# Patient Record
Sex: FEMALE | Race: BLACK OR AFRICAN AMERICAN | NOT HISPANIC OR LATINO | Employment: UNEMPLOYED | URBAN - METROPOLITAN AREA
[De-identification: names, ages, dates, MRNs, and addresses within clinical notes are randomized per-mention and may not be internally consistent; named-entity substitution may affect disease eponyms.]

---

## 2023-04-18 LAB
CHLAMYDIA TRACH., AMPLIFIED: NEGATIVE
CLUE CELLS: NORMAL
N. GONORRHEA, AMPLIFIED: NEGATIVE
NUGENT SCORE: 0
TRICHOMONAS VAGINALIS: NEGATIVE
YEAST: NORMAL

## 2023-05-01 LAB
ALANINE AMINOTRANSFERASE (SGPT) (U/L) IN SER/PLAS: 13 U/L (ref 7–45)
ALBUMIN (G/DL) IN SER/PLAS: 4.6 G/DL (ref 3.4–5)
ALKALINE PHOSPHATASE (U/L) IN SER/PLAS: 31 U/L (ref 33–110)
ANION GAP IN SER/PLAS: 13 MMOL/L (ref 10–20)
ASPARTATE AMINOTRANSFERASE (SGOT) (U/L) IN SER/PLAS: 18 U/L (ref 9–39)
BASOPHILS (10*3/UL) IN BLOOD BY AUTOMATED COUNT: 0.03 X10E9/L (ref 0–0.1)
BASOPHILS/100 LEUKOCYTES IN BLOOD BY AUTOMATED COUNT: 0.6 % (ref 0–2)
BILIRUBIN TOTAL (MG/DL) IN SER/PLAS: 0.4 MG/DL (ref 0–1.2)
CALCIDIOL (25 OH VITAMIN D3) (NG/ML) IN SER/PLAS: 13 NG/ML
CALCIUM (MG/DL) IN SER/PLAS: 9.6 MG/DL (ref 8.6–10.6)
CARBON DIOXIDE, TOTAL (MMOL/L) IN SER/PLAS: 24 MMOL/L (ref 21–32)
CHLORIDE (MMOL/L) IN SER/PLAS: 105 MMOL/L (ref 98–107)
CHOLESTEROL (MG/DL) IN SER/PLAS: 169 MG/DL (ref 0–199)
CHOLESTEROL IN HDL (MG/DL) IN SER/PLAS: 72 MG/DL
CHOLESTEROL/HDL RATIO: 2.3
CREATININE (MG/DL) IN SER/PLAS: 0.62 MG/DL (ref 0.5–1.05)
EOSINOPHILS (10*3/UL) IN BLOOD BY AUTOMATED COUNT: 0.1 X10E9/L (ref 0–0.7)
EOSINOPHILS/100 LEUKOCYTES IN BLOOD BY AUTOMATED COUNT: 2.1 % (ref 0–6)
ERYTHROCYTE DISTRIBUTION WIDTH (RATIO) BY AUTOMATED COUNT: 13.1 % (ref 11.5–14.5)
ERYTHROCYTE MEAN CORPUSCULAR HEMOGLOBIN CONCENTRATION (G/DL) BY AUTOMATED: 31.5 G/DL (ref 32–36)
ERYTHROCYTE MEAN CORPUSCULAR VOLUME (FL) BY AUTOMATED COUNT: 84 FL (ref 80–100)
ERYTHROCYTES (10*6/UL) IN BLOOD BY AUTOMATED COUNT: 4.63 X10E12/L (ref 4–5.2)
ESTIMATED AVERAGE GLUCOSE FOR HBA1C: 94 MG/DL
GFR FEMALE: >90 ML/MIN/1.73M2
GLUCOSE (MG/DL) IN SER/PLAS: 85 MG/DL (ref 74–99)
HEMATOCRIT (%) IN BLOOD BY AUTOMATED COUNT: 39.1 % (ref 36–46)
HEMOGLOBIN (G/DL) IN BLOOD: 12.3 G/DL (ref 12–16)
HEMOGLOBIN A1C/HEMOGLOBIN TOTAL IN BLOOD: 4.9 %
IMMATURE GRANULOCYTES/100 LEUKOCYTES IN BLOOD BY AUTOMATED COUNT: 0.4 % (ref 0–0.9)
LDL: 85 MG/DL (ref 0–99)
LEUKOCYTES (10*3/UL) IN BLOOD BY AUTOMATED COUNT: 4.7 X10E9/L (ref 4.4–11.3)
LYMPHOCYTES (10*3/UL) IN BLOOD BY AUTOMATED COUNT: 2.3 X10E9/L (ref 1.2–4.8)
LYMPHOCYTES/100 LEUKOCYTES IN BLOOD BY AUTOMATED COUNT: 48.9 % (ref 13–44)
MONOCYTES (10*3/UL) IN BLOOD BY AUTOMATED COUNT: 0.26 X10E9/L (ref 0.1–1)
MONOCYTES/100 LEUKOCYTES IN BLOOD BY AUTOMATED COUNT: 5.5 % (ref 2–10)
NEUTROPHILS (10*3/UL) IN BLOOD BY AUTOMATED COUNT: 1.99 X10E9/L (ref 1.2–7.7)
NEUTROPHILS/100 LEUKOCYTES IN BLOOD BY AUTOMATED COUNT: 42.5 % (ref 40–80)
NRBC (PER 100 WBCS) BY AUTOMATED COUNT: 0 /100 WBC (ref 0–0)
PLATELETS (10*3/UL) IN BLOOD AUTOMATED COUNT: 280 X10E9/L (ref 150–450)
POTASSIUM (MMOL/L) IN SER/PLAS: 4.2 MMOL/L (ref 3.5–5.3)
PROTEIN TOTAL: 7.5 G/DL (ref 6.4–8.2)
SODIUM (MMOL/L) IN SER/PLAS: 138 MMOL/L (ref 136–145)
THYROTROPIN (MIU/L) IN SER/PLAS BY DETECTION LIMIT <= 0.05 MIU/L: 0.68 MIU/L (ref 0.44–3.98)
TRIGLYCERIDE (MG/DL) IN SER/PLAS: 61 MG/DL (ref 0–149)
UREA NITROGEN (MG/DL) IN SER/PLAS: 8 MG/DL (ref 6–23)
VLDL: 12 MG/DL (ref 0–40)

## 2023-09-20 LAB
HIV 1/ 2 AG/AB SCREEN: NONREACTIVE
SYPHILIS TOTAL AB: NONREACTIVE

## 2023-09-21 LAB
CHLAMYDIA TRACH., AMPLIFIED: NEGATIVE
N. GONORRHEA, AMPLIFIED: NEGATIVE

## 2023-11-15 PROBLEM — F32.A DEPRESSION: Status: ACTIVE | Noted: 2023-11-15

## 2023-11-15 PROBLEM — H52.03 HYPEROPIA OF BOTH EYES WITH ASTIGMATISM: Status: ACTIVE | Noted: 2023-11-15

## 2023-11-15 PROBLEM — R31.9 HEMATURIA: Status: ACTIVE | Noted: 2023-11-15

## 2023-11-15 PROBLEM — H52.00 HYPEROPIA: Status: ACTIVE | Noted: 2023-11-15

## 2023-11-15 PROBLEM — R07.89 ATYPICAL CHEST PAIN: Status: ACTIVE | Noted: 2023-11-15

## 2023-11-15 PROBLEM — N76.0 ACUTE VAGINITIS: Status: ACTIVE | Noted: 2023-11-15

## 2023-11-15 PROBLEM — A59.9 TRICHOMONIASIS: Status: ACTIVE | Noted: 2023-11-15

## 2023-11-15 PROBLEM — M25.519 SHOULDER PAIN: Status: ACTIVE | Noted: 2023-11-15

## 2023-11-15 PROBLEM — N89.8 VAGINAL DISCHARGE: Status: ACTIVE | Noted: 2023-11-15

## 2023-11-15 PROBLEM — J06.9 VIRAL URI: Status: ACTIVE | Noted: 2023-11-15

## 2023-11-15 PROBLEM — F48.9 MENTAL HEALTH PROBLEM: Status: ACTIVE | Noted: 2023-11-15

## 2023-11-15 PROBLEM — L02.93 CARBUNCLE: Status: ACTIVE | Noted: 2023-11-15

## 2023-11-15 PROBLEM — F41.9 ANXIETY: Status: ACTIVE | Noted: 2023-11-15

## 2023-11-15 PROBLEM — T83.32XA MALPOSITIONED INTRAUTERINE DEVICE: Status: ACTIVE | Noted: 2023-11-15

## 2023-11-15 PROBLEM — J20.9 ACUTE BRONCHITIS: Status: ACTIVE | Noted: 2023-11-15

## 2023-11-15 PROBLEM — L73.1 INGROWING HAIR: Status: ACTIVE | Noted: 2023-11-15

## 2023-11-15 PROBLEM — J30.2 SEASONAL ALLERGIES: Status: ACTIVE | Noted: 2023-11-15

## 2023-11-15 PROBLEM — L73.2 VULVAL HIDRADENITIS SUPPURATIVA: Status: ACTIVE | Noted: 2023-11-15

## 2023-11-15 PROBLEM — F12.90 MARIJUANA USE: Status: ACTIVE | Noted: 2023-11-15

## 2023-11-15 PROBLEM — N92.6 ABNORMAL MENSES: Status: ACTIVE | Noted: 2023-11-15

## 2023-11-15 PROBLEM — H52.539 ACCOMMODATION SPASM: Status: ACTIVE | Noted: 2023-11-15

## 2023-11-15 PROBLEM — M25.50 ARTHRALGIA: Status: ACTIVE | Noted: 2023-11-15

## 2023-11-15 PROBLEM — M77.8 TENDINITIS OF LEFT SHOULDER: Status: ACTIVE | Noted: 2023-11-15

## 2023-11-15 PROBLEM — R42 FEELING LIGHT HEADED: Status: ACTIVE | Noted: 2023-11-15

## 2023-11-15 PROBLEM — B34.9 VIRAL ILLNESS: Status: ACTIVE | Noted: 2023-11-15

## 2023-11-15 PROBLEM — M25.561 ACUTE PAIN OF RIGHT KNEE: Status: ACTIVE | Noted: 2023-11-15

## 2023-11-15 PROBLEM — G47.00 INSOMNIA: Status: ACTIVE | Noted: 2023-11-15

## 2023-11-15 PROBLEM — M94.0 COSTOCHONDRITIS: Status: ACTIVE | Noted: 2023-11-15

## 2023-11-15 PROBLEM — M54.2 NECK PAIN: Status: ACTIVE | Noted: 2023-11-15

## 2023-11-15 PROBLEM — H52.203 HYPEROPIA OF BOTH EYES WITH ASTIGMATISM: Status: ACTIVE | Noted: 2023-11-15

## 2023-11-15 PROBLEM — M75.52 BURSITIS OF LEFT SHOULDER: Status: ACTIVE | Noted: 2023-11-15

## 2023-11-15 PROBLEM — F17.200 NICOTINE DEPENDENCE: Status: ACTIVE | Noted: 2023-11-15

## 2023-11-15 PROBLEM — B96.89 BACTERIAL VAGINOSIS: Status: ACTIVE | Noted: 2023-11-15

## 2023-11-15 PROBLEM — N76.4 VULVAR ABSCESS: Status: ACTIVE | Noted: 2023-11-15

## 2023-11-15 PROBLEM — Z97.3 WEARS EYEGLASSES: Status: ACTIVE | Noted: 2023-11-15

## 2023-11-15 PROBLEM — N76.0 BACTERIAL VAGINOSIS: Status: ACTIVE | Noted: 2023-11-15

## 2023-11-15 RX ORDER — SERTRALINE HYDROCHLORIDE 50 MG/1
1 TABLET, FILM COATED ORAL DAILY
COMMUNITY
Start: 2022-08-17 | End: 2023-11-16 | Stop reason: WASHOUT

## 2023-11-15 RX ORDER — HYDROXYZINE HYDROCHLORIDE 25 MG/1
1 TABLET, FILM COATED ORAL 3 TIMES DAILY PRN
COMMUNITY
Start: 2022-08-17 | End: 2023-11-16 | Stop reason: WASHOUT

## 2023-11-15 RX ORDER — DESOGESTREL AND ETHINYL ESTRADIOL 0.15-0.03
1 KIT ORAL DAILY
COMMUNITY
End: 2023-11-16 | Stop reason: WASHOUT

## 2023-11-15 RX ORDER — ALBUTEROL SULFATE 90 UG/1
2 AEROSOL, METERED RESPIRATORY (INHALATION) EVERY 4 HOURS PRN
COMMUNITY
Start: 2020-12-14

## 2023-11-15 RX ORDER — FLUOXETINE 10 MG/1
1 CAPSULE ORAL DAILY
COMMUNITY
End: 2023-11-16 | Stop reason: WASHOUT

## 2023-11-15 RX ORDER — ACETAMINOPHEN 500 MG
1 TABLET ORAL NIGHTLY
COMMUNITY
Start: 2022-10-27 | End: 2024-04-09 | Stop reason: WASHOUT

## 2023-11-16 ENCOUNTER — OFFICE VISIT (OUTPATIENT)
Dept: PRIMARY CARE | Facility: HOSPITAL | Age: 33
End: 2023-11-16
Payer: COMMERCIAL

## 2023-11-16 VITALS
HEART RATE: 66 BPM | TEMPERATURE: 97.5 F | BODY MASS INDEX: 28.89 KG/M2 | HEIGHT: 61 IN | SYSTOLIC BLOOD PRESSURE: 122 MMHG | WEIGHT: 153 LBS | DIASTOLIC BLOOD PRESSURE: 73 MMHG | OXYGEN SATURATION: 97 %

## 2023-11-16 DIAGNOSIS — H53.9 VISION CHANGES: ICD-10-CM

## 2023-11-16 DIAGNOSIS — F41.9 ANXIETY: Primary | ICD-10-CM

## 2023-11-16 DIAGNOSIS — F33.1 MODERATE EPISODE OF RECURRENT MAJOR DEPRESSIVE DISORDER (MULTI): ICD-10-CM

## 2023-11-16 PROBLEM — F43.10 PTSD (POST-TRAUMATIC STRESS DISORDER): Status: ACTIVE | Noted: 2023-04-20

## 2023-11-16 PROBLEM — M75.50 BURSITIS OF SHOULDER: Status: ACTIVE | Noted: 2023-11-16

## 2023-11-16 PROBLEM — F39 UNSPECIFIED MOOD (AFFECTIVE) DISORDER (CMS-HCC): Chronic | Status: ACTIVE | Noted: 2023-04-20

## 2023-11-16 PROBLEM — F17.200 NICOTINE USE DISORDER: Status: ACTIVE | Noted: 2019-09-12

## 2023-11-16 PROBLEM — F12.20 SEVERE CANNABIS USE DISORDER (MULTI): Chronic | Status: ACTIVE | Noted: 2023-04-20

## 2023-11-16 PROBLEM — F33.2 MDD (MAJOR DEPRESSIVE DISORDER), RECURRENT EPISODE, SEVERE (MULTI): Status: ACTIVE | Noted: 2019-09-11

## 2023-11-16 PROBLEM — F32.2 CURRENT SEVERE EPISODE OF MAJOR DEPRESSIVE DISORDER WITHOUT PSYCHOTIC FEATURES (MULTI): Chronic | Status: ACTIVE | Noted: 2023-03-10

## 2023-11-16 PROCEDURE — 1036F TOBACCO NON-USER: CPT

## 2023-11-16 PROCEDURE — 99213 OFFICE O/P EST LOW 20 MIN: CPT | Mod: GC

## 2023-11-16 PROCEDURE — 99213 OFFICE O/P EST LOW 20 MIN: CPT

## 2023-11-16 RX ORDER — HYDROXYZINE HYDROCHLORIDE 25 MG/1
25 TABLET, FILM COATED ORAL NIGHTLY
Qty: 30 TABLET | Refills: 2 | Status: SHIPPED | OUTPATIENT
Start: 2023-11-16 | End: 2024-02-14

## 2023-11-16 RX ORDER — CLINDAMYCIN PHOSPHATE 10 MG/G
1 GEL TOPICAL 2 TIMES DAILY
COMMUNITY
Start: 2023-09-20

## 2023-11-16 RX ORDER — NICOTINE 11MG/24HR
2000 PATCH, TRANSDERMAL 24 HOURS TRANSDERMAL DAILY
COMMUNITY
Start: 2023-09-15 | End: 2024-03-29 | Stop reason: SDUPTHER

## 2023-11-16 RX ORDER — RISPERIDONE 0.5 MG/1
0.5 TABLET ORAL NIGHTLY
COMMUNITY
Start: 2023-04-05 | End: 2023-11-16 | Stop reason: WASHOUT

## 2023-11-16 RX ORDER — FLUOXETINE HYDROCHLORIDE 20 MG/1
20 CAPSULE ORAL DAILY
Qty: 7 CAPSULE | Refills: 0 | Status: SHIPPED | OUTPATIENT
Start: 2023-11-16 | End: 2024-03-29 | Stop reason: WASHOUT

## 2023-11-16 RX ORDER — IBUPROFEN 600 MG/1
600 TABLET ORAL EVERY 6 HOURS PRN
COMMUNITY
Start: 2023-02-23 | End: 2024-04-09 | Stop reason: WASHOUT

## 2023-11-16 RX ORDER — PROPRANOLOL HYDROCHLORIDE 10 MG/1
10 TABLET ORAL 3 TIMES DAILY PRN
COMMUNITY
Start: 2023-09-20

## 2023-11-16 RX ORDER — LURASIDONE HYDROCHLORIDE 20 MG/1
20 TABLET, FILM COATED ORAL
COMMUNITY
Start: 2023-04-20 | End: 2023-11-16 | Stop reason: WASHOUT

## 2023-11-16 RX ORDER — NORETHINDRONE ACETATE/ETHINYL ESTRADIOL AND FERROUS FUMARATE 1MG-20(24)
1 KIT ORAL DAILY
COMMUNITY
Start: 2023-09-15 | End: 2023-12-22

## 2023-11-16 RX ORDER — ARIPIPRAZOLE 5 MG/1
5 TABLET ORAL DAILY
COMMUNITY
Start: 2023-03-13 | End: 2023-11-16 | Stop reason: WASHOUT

## 2023-11-16 RX ORDER — FLUOXETINE HYDROCHLORIDE 40 MG/1
40 CAPSULE ORAL DAILY
Qty: 30 CAPSULE | Refills: 2 | Status: SHIPPED | OUTPATIENT
Start: 2023-11-16 | End: 2024-03-29 | Stop reason: WASHOUT

## 2023-11-16 ASSESSMENT — PATIENT HEALTH QUESTIONNAIRE - PHQ9
SUM OF ALL RESPONSES TO PHQ9 QUESTIONS 1 AND 2: 2
2. FEELING DOWN, DEPRESSED OR HOPELESS: SEVERAL DAYS
10. IF YOU CHECKED OFF ANY PROBLEMS, HOW DIFFICULT HAVE THESE PROBLEMS MADE IT FOR YOU TO DO YOUR WORK, TAKE CARE OF THINGS AT HOME, OR GET ALONG WITH OTHER PEOPLE: SOMEWHAT DIFFICULT
1. LITTLE INTEREST OR PLEASURE IN DOING THINGS: SEVERAL DAYS

## 2023-11-16 ASSESSMENT — PAIN SCALES - GENERAL: PAINLEVEL: 0-NO PAIN

## 2023-11-16 ASSESSMENT — COLUMBIA-SUICIDE SEVERITY RATING SCALE - C-SSRS
1. IN THE PAST MONTH, HAVE YOU WISHED YOU WERE DEAD OR WISHED YOU COULD GO TO SLEEP AND NOT WAKE UP?: NO
6. HAVE YOU EVER DONE ANYTHING, STARTED TO DO ANYTHING, OR PREPARED TO DO ANYTHING TO END YOUR LIFE?: NO
2. HAVE YOU ACTUALLY HAD ANY THOUGHTS OF KILLING YOURSELF?: NO

## 2023-11-16 ASSESSMENT — ENCOUNTER SYMPTOMS
DEPRESSION: 0
OCCASIONAL FEELINGS OF UNSTEADINESS: 0
LOSS OF SENSATION IN FEET: 0

## 2023-11-16 NOTE — PROGRESS NOTES
"Chief complaint:  Anxiety and depression f/up    HPI:  Brittany Chavez is a 33 y.o. female with history of anxiety, depression, hidradenitis suppurativa, who presents for follow up. She was last seen by me on 9/20/2023.    Regarding her anxiety/depression, she feels she's gotten some benefit from fluoxetine but still has days with low mood and feeling like she doesn't want to be around people. She tolerated fluoxetine well aside from some dry mouth in the mornings, no other side effects. She does feel anhedonia has improved. She reports her depression and anxiety is \"about 19% better.\" Still having difficulty with sleep, saying it's hard to calm her mind down at night. Hasn't taken propranolol though has had it in her hand during a panic attack a few times and thought about it. Denies SI/HI. PHQ-9 today is 14, JOSEFA-7 is 13, both improved from prior.    Ran out of fluoxetine so hasn't taken it for a week and a half, but hasn't had any withdrawal symptoms.    Discussed migraine with aura at last visit. She has not had any further severe headache since then. Does report some intermittent vision blurriness and wants to get new glasses.    Medications:    Current Outpatient Medications:     clindamycin (Cleocin T) 1 % gel, Apply 1 Application topically 2 times a day., Disp: , Rfl:     ibuprofen 600 mg tablet, Take 1 tablet (600 mg) by mouth every 6 hours if needed for mild pain (1 - 3)., Disp: , Rfl:     Eden 24 Fe 1 mg-20 mcg (24)/75 mg (4) tablet, Take 1 tablet by mouth once daily., Disp: , Rfl:     propranolol (Inderal) 10 mg tablet, Take 1 tablet (10 mg) by mouth 3 times a day as needed., Disp: , Rfl:     Vitamin D3 50 mcg (2,000 unit) capsule, Take 1 capsule (50 mcg) by mouth early in the morning.., Disp: , Rfl:     albuterol (Ventolin HFA) 90 mcg/actuation inhaler, Inhale 2 puffs every 4 hours if needed for shortness of breath or wheezing., Disp: , Rfl:     FLUoxetine (PROzac) 20 mg capsule, Take 1 capsule (20 mg) " by mouth once daily for 7 days., Disp: 7 capsule, Rfl: 0    FLUoxetine (PROzac) 40 mg capsule, Take 1 capsule (40 mg) by mouth once daily., Disp: 30 capsule, Rfl: 2    hydrOXYzine HCL (Atarax) 25 mg tablet, Take 1 tablet (25 mg) by mouth once daily at bedtime., Disp: 30 tablet, Rfl: 2    melatonin 5 mg tablet, Take 1 tablet (5 mg) by mouth once daily at bedtime., Disp: , Rfl:     Allergies:  Allergies   Allergen Reactions    Naproxen Other, Rash and Swelling     peeling of skin       Review of systems:  Constitutional: positive for increased appetite  HEENT: negative for headache, positive for blurry vision, xerostomia  Respiratory: negative for SOB, cough  Cardiovascular: negative for chest pain, palpitations  GI: negative abdominal pain, nausea, vomiting, diarrhea, constipation  Psych: positive for anxiety, depression    Vitals:  Vitals:    11/16/23 1312   BP: 122/73   Pulse: 66   Temp: 36.4 °C (97.5 °F)   SpO2: 97%       Physical exam:  Constitutional: Well-developed female in no acute distress.  HEENT: Normocephalic, atraumatic  Respiratory: CTA bilaterally. No wheezes, rales, or rhonchi. Normal respiratory effort.  Cardiovascular: RRR. No murmurs, gallops, or rubs.   Psych: Appropriate mood. Slightly blunted affect.    Labs:  No results found for this or any previous visit (from the past 24 hour(s)).    Imaging:  No results found.    Assessment and plan:  Brittany Chavez is a 33 year old female with PMHx anxiety, depression, and hidradenitis suppurativa that presents for mental health follow up.     #Depression  #Anxiety  #Panic disorder  #Insomnia  Had good response to fluoxetine 20 mg with minimal side effects  Has not taken propranolol though has them at home, may take them in past  Plan:  Increase fluoxetine to 40 mg daily (will have her do one week of 20 mg then increase because she did not take it for the past week). She will let me know via portal if she does not tolerate 40 mg and we can decrease to 20 mg  daily  Start hydroxyzine 25 mg nightly for anxiety/insomnia  Recommended she try propranolol 10 mg TID PRN     #vision change  Referred pt to optometry for vision eval     Not addressed at this visit:     #health maintenance   lipid panel wnl, HbA1c 4.9% 5/2023  HIV, Hep B and C negative in 2020  pap smear 4/2023 normal    Follow-up in 8 weeks with me via video visit.    Patient and plan discussed with attending physician, Dr. Garcia.    Brenda Garcia MD  PGY-1 Internal Medicine  [facility]

## 2023-11-16 NOTE — PATIENT INSTRUCTIONS
It was a pleasure to see you in clinic today! We discussed the following topics:  Anxiety and depression: I'm glad you have been benefitting from fluoxetine! Let's increase your dose to 40 mg daily (after a week of 20 daily to get it back in your system). You'll also take hydroxyzine (atarax) one tablet nightly to help with sleep. Give the propranolol a try when you're anxious too--it might help calm you down.    Please follow-up with an video visit with us in 6-8 weeks.     is now using [employer], an online health record portal with your visit notes and results and the ability to portal message your physicians. Ask about getting access at the check-out desk!    To schedule a specialty appointment or test, please call [phone].    If you have any questions or concerns, please contact the [facility] at [phone].    Sincerely,    Brenda Garcia MD  PGY-1 Internal Medicine  [facility]  [facility]  Phone: [phone]  Fax: [phone]

## 2023-11-21 NOTE — PROGRESS NOTES
I saw and evaluated the patient. I personally obtained the key and critical portions of the history and physical exam or was physically present for key and critical portions performed by the resident/fellow. I reviewed the resident/fellow's documentation and discussed the patient with the resident/fellow. I agree with the resident/fellow's medical decision making as documented in the note.    Alisha Garcia MD MPH

## 2023-12-28 ENCOUNTER — TELEPHONE (OUTPATIENT)
Dept: GERIATRIC MEDICINE | Facility: HOSPITAL | Age: 33
End: 2023-12-28
Payer: COMMERCIAL

## 2024-01-25 ENCOUNTER — OFFICE VISIT (OUTPATIENT)
Dept: URGENT CARE | Facility: CLINIC | Age: 34
End: 2024-01-25
Payer: COMMERCIAL

## 2024-01-25 VITALS
WEIGHT: 155.2 LBS | BODY MASS INDEX: 29.32 KG/M2 | TEMPERATURE: 98 F | OXYGEN SATURATION: 98 % | RESPIRATION RATE: 16 BRPM | SYSTOLIC BLOOD PRESSURE: 108 MMHG | HEART RATE: 70 BPM | DIASTOLIC BLOOD PRESSURE: 71 MMHG

## 2024-01-25 DIAGNOSIS — J01.10 ACUTE NON-RECURRENT FRONTAL SINUSITIS: ICD-10-CM

## 2024-01-25 DIAGNOSIS — N76.0 ACUTE VAGINITIS: Primary | ICD-10-CM

## 2024-01-25 PROCEDURE — 87205 SMEAR GRAM STAIN: CPT

## 2024-01-25 PROCEDURE — 87800 DETECT AGNT MULT DNA DIREC: CPT

## 2024-01-25 PROCEDURE — 87661 TRICHOMONAS VAGINALIS AMPLIF: CPT

## 2024-01-25 PROCEDURE — 1036F TOBACCO NON-USER: CPT | Performed by: PHYSICIAN ASSISTANT

## 2024-01-25 PROCEDURE — 99214 OFFICE O/P EST MOD 30 MIN: CPT | Performed by: PHYSICIAN ASSISTANT

## 2024-01-25 RX ORDER — FLUCONAZOLE 150 MG/1
TABLET ORAL
Qty: 2 TABLET | Refills: 0 | Status: SHIPPED | OUTPATIENT
Start: 2024-01-25

## 2024-01-25 RX ORDER — AMOXICILLIN AND CLAVULANATE POTASSIUM 875; 125 MG/1; MG/1
1 TABLET, FILM COATED ORAL 2 TIMES DAILY
Qty: 14 TABLET | Refills: 0 | Status: SHIPPED | OUTPATIENT
Start: 2024-01-25 | End: 2024-02-01

## 2024-01-25 ASSESSMENT — ENCOUNTER SYMPTOMS
DYSURIA: 0
EYE DISCHARGE: 0
ACTIVITY CHANGE: 0
COUGH: 1
EYE REDNESS: 0
WOUND: 0
SINUS PAIN: 1
ENDOCRINE NEGATIVE: 1
BLOOD IN STOOL: 0
PSYCHIATRIC NEGATIVE: 1
SINUS PRESSURE: 1
BACK PAIN: 0
APPETITE CHANGE: 0
VOMITING: 0
RHINORRHEA: 1
ARTHRALGIAS: 0
NEUROLOGICAL NEGATIVE: 1
SORE THROAT: 1
SHORTNESS OF BREATH: 0
ABDOMINAL PAIN: 0
FLANK PAIN: 0
FATIGUE: 0
FEVER: 0
WHEEZING: 0
ALLERGIC/IMMUNOLOGIC NEGATIVE: 1
DIARRHEA: 0
HEMATOLOGIC/LYMPHATIC NEGATIVE: 1
COLOR CHANGE: 0
EYE PAIN: 0
NAUSEA: 0

## 2024-01-25 ASSESSMENT — PAIN SCALES - GENERAL: PAINLEVEL: 7

## 2024-01-25 NOTE — PATIENT INSTRUCTIONS
Assessment/Plan   Problem List Items Addressed This Visit       Acute vaginitis - Primary    Relevant Medications    fluconazole (Diflucan) 150 mg tablet     Other Visit Diagnoses       Acute non-recurrent frontal sinusitis        Relevant Medications    amoxicillin-pot clavulanate (Augmentin) 875-125 mg tablet        -Patient with sinusitis sxs for 7 days, worsening. Treating with augmentin  - physical exam raises suspicion for yeast vaginitis, treating with diflucan  -Sending testing for gonorrhea, chlamydia, trichomonas, and yeast vaginitis and bacterial vaginosis

## 2024-01-25 NOTE — PROGRESS NOTES
Subjective   Patient ID: Brittany Chavez is a 34 y.o. female who presents for Cough (Vag irritation).  Patient notes frontal headache nasal congestion and cough over the course the last week.  She denies any fevers or chills denies any shortness of breath or chest pain.  She also has a secondary complaint of vaginal irritation and milky white discharge.  She is concerned about the possibility of STIs versus bacterial vaginosis versus yeast.  She denies any pain but does endorse external irritation    Past Medical History:   Diagnosis Date    Chlamydial infection, unspecified     Chlamydia    Dysuria 10/27/2014    Dysuria    Encounter for fitting and adjustment of spectacles and contact lenses     Contact lens/glasses fitting    Encounter for gynecological examination (general) (routine) without abnormal findings 05/15/2015    Well woman exam    Encounter for insertion of intrauterine contraceptive device     Encounter for insertion of mirena IUD    Encounter for insertion of intrauterine contraceptive device     Encounter for insertion of mirena IUD    Encounter for supervision of normal first pregnancy, unspecified trimester     Primigravida    Other conditions influencing health status     History of pregnancy    Other problems related to lifestyle 07/12/2016    Other problems related to lifestyle    Other specified health status     Last menstrual period (LMP) > 10 days ago    Other specified postprocedural states     Required emergent intubation    Personal history of other diseases of the nervous system and sense organs     History of sleep disturbance    Personal history of other diseases of the respiratory system     Personal history of asthma    Personal history of other infectious and parasitic diseases     History of sexually transmitted disease    Personal history of other mental and behavioral disorders     History of depression    Type A blood, Rh negative     Blood type A-    Urogenital trichomoniasis,  unspecified     Trichs - trichomonas vaginalis infection       Review of Systems   Constitutional:  Negative for activity change, appetite change, fatigue and fever.   HENT:  Positive for congestion, rhinorrhea, sinus pressure, sinus pain and sore throat.    Eyes:  Negative for pain, discharge and redness.   Respiratory:  Positive for cough. Negative for shortness of breath and wheezing.    Cardiovascular:  Negative for chest pain and leg swelling.   Gastrointestinal:  Negative for abdominal pain, blood in stool, diarrhea, nausea and vomiting.   Endocrine: Negative.    Genitourinary:  Positive for vaginal discharge. Negative for dysuria, flank pain and genital sores.   Musculoskeletal:  Negative for arthralgias, back pain and gait problem.   Skin:  Negative for color change, rash and wound.   Allergic/Immunologic: Negative.    Neurological: Negative.    Hematological: Negative.    Psychiatric/Behavioral: Negative.         Objective   /71   Pulse 70   Temp 36.7 °C (98 °F)   Resp 16   Wt 70.4 kg (155 lb 3.2 oz)   SpO2 98%   BMI 29.32 kg/m²   Physical Exam  Vitals reviewed. Exam conducted with a chaperone present (Taylor CHAVEZ).   Constitutional:       General: She is not in acute distress.     Appearance: Normal appearance. She is not toxic-appearing.   HENT:      Head: Normocephalic.      Right Ear: Tympanic membrane and ear canal normal. No tenderness. No mastoid tenderness.      Left Ear: Tympanic membrane and ear canal normal. No tenderness. No mastoid tenderness.      Nose: Congestion and rhinorrhea present.      Mouth/Throat:      Mouth: Mucous membranes are moist.      Pharynx: Oropharynx is clear. Posterior oropharyngeal erythema present.   Eyes:      Conjunctiva/sclera: Conjunctivae normal.      Pupils: Pupils are equal, round, and reactive to light.   Cardiovascular:      Rate and Rhythm: Normal rate and regular rhythm.      Heart sounds: No murmur heard.  Pulmonary:      Effort: No respiratory  distress.      Breath sounds: No stridor. No wheezing, rhonchi or rales.   Chest:      Chest wall: No tenderness.   Abdominal:      Tenderness: There is no abdominal tenderness. There is no guarding.   Genitourinary:     Pubic Area: No rash.       Labia:         Right: No rash, tenderness or lesion.         Left: No rash, tenderness or lesion.       Vagina: No foreign body. No erythema, tenderness or lesions.      Cervix: Discharge present. No cervical motion tenderness, friability, erythema or cervical bleeding.      Comments: Clumping white vaginal discharge present  Musculoskeletal:         General: Normal range of motion.   Skin:     General: Skin is warm and dry.      Capillary Refill: Capillary refill takes less than 2 seconds.      Findings: No erythema.   Neurological:      General: No focal deficit present.      Mental Status: She is alert.         Assessment/Plan   Problem List Items Addressed This Visit       Acute vaginitis - Primary    Relevant Medications    fluconazole (Diflucan) 150 mg tablet     Other Visit Diagnoses       Acute non-recurrent frontal sinusitis        Relevant Medications    amoxicillin-pot clavulanate (Augmentin) 875-125 mg tablet        -Patient with sinusitis sxs for 7 days, worsening. Treating with augmentin  - physical exam raises suspicion for yeast vaginitis, treating with diflucan  -Sending testing for gonorrhea, chlamydia, trichomonas, and yeast vaginitis and bacterial vaginosis

## 2024-01-26 LAB
C TRACH RRNA SPEC QL NAA+PROBE: NEGATIVE
N GONORRHOEA DNA SPEC QL PROBE+SIG AMP: NEGATIVE
T VAGINALIS RRNA SPEC QL NAA+PROBE: NEGATIVE

## 2024-01-27 LAB
CLUE CELLS VAG LPF-#/AREA: ABNORMAL /[LPF]
NUGENT SCORE: 2
YEAST VAG WET PREP-#/AREA: PRESENT

## 2024-03-27 ENCOUNTER — APPOINTMENT (OUTPATIENT)
Dept: PRIMARY CARE | Facility: HOSPITAL | Age: 34
End: 2024-03-27
Payer: COMMERCIAL

## 2024-03-29 ENCOUNTER — OFFICE VISIT (OUTPATIENT)
Dept: PRIMARY CARE | Facility: HOSPITAL | Age: 34
End: 2024-03-29
Payer: COMMERCIAL

## 2024-03-29 VITALS
TEMPERATURE: 97.4 F | BODY MASS INDEX: 29.27 KG/M2 | OXYGEN SATURATION: 100 % | SYSTOLIC BLOOD PRESSURE: 105 MMHG | WEIGHT: 155 LBS | HEART RATE: 61 BPM | DIASTOLIC BLOOD PRESSURE: 70 MMHG | HEIGHT: 61 IN

## 2024-03-29 DIAGNOSIS — F33.1 MODERATE EPISODE OF RECURRENT MAJOR DEPRESSIVE DISORDER (MULTI): ICD-10-CM

## 2024-03-29 DIAGNOSIS — F41.9 ANXIETY: ICD-10-CM

## 2024-03-29 DIAGNOSIS — H53.9 VISION CHANGES: ICD-10-CM

## 2024-03-29 DIAGNOSIS — E55.9 VITAMIN D DEFICIENCY: Primary | ICD-10-CM

## 2024-03-29 DIAGNOSIS — N94.6 DYSMENORRHEA: ICD-10-CM

## 2024-03-29 PROCEDURE — 1036F TOBACCO NON-USER: CPT

## 2024-03-29 PROCEDURE — 99214 OFFICE O/P EST MOD 30 MIN: CPT | Mod: GC

## 2024-03-29 PROCEDURE — 99214 OFFICE O/P EST MOD 30 MIN: CPT

## 2024-03-29 RX ORDER — FLUOXETINE HYDROCHLORIDE 20 MG/1
20 CAPSULE ORAL DAILY
Qty: 30 CAPSULE | Refills: 11 | Status: SHIPPED | OUTPATIENT
Start: 2024-03-29 | End: 2025-03-29

## 2024-03-29 RX ORDER — NICOTINE 11MG/24HR
2000 PATCH, TRANSDERMAL 24 HOURS TRANSDERMAL DAILY
Qty: 30 CAPSULE | Refills: 11 | Status: SHIPPED | OUTPATIENT
Start: 2024-03-29 | End: 2025-03-29

## 2024-03-29 ASSESSMENT — PAIN SCALES - GENERAL: PAINLEVEL: 0-NO PAIN

## 2024-03-29 ASSESSMENT — ENCOUNTER SYMPTOMS
OCCASIONAL FEELINGS OF UNSTEADINESS: 0
DEPRESSION: 0
LOSS OF SENSATION IN FEET: 0

## 2024-03-29 ASSESSMENT — LIFESTYLE VARIABLES
HOW OFTEN DO YOU HAVE SIX OR MORE DRINKS ON ONE OCCASION: NEVER
HOW MANY STANDARD DRINKS CONTAINING ALCOHOL DO YOU HAVE ON A TYPICAL DAY: PATIENT DOES NOT DRINK
AUDIT-C TOTAL SCORE: 0
SKIP TO QUESTIONS 9-10: 1
HOW OFTEN DO YOU HAVE A DRINK CONTAINING ALCOHOL: NEVER

## 2024-03-29 ASSESSMENT — PATIENT HEALTH QUESTIONNAIRE - PHQ9
1. LITTLE INTEREST OR PLEASURE IN DOING THINGS: SEVERAL DAYS
SUM OF ALL RESPONSES TO PHQ9 QUESTIONS 1 & 2: 2
2. FEELING DOWN, DEPRESSED OR HOPELESS: SEVERAL DAYS

## 2024-03-29 NOTE — PATIENT INSTRUCTIONS
Dear Ms. Scott,     It was a pleasure meeting you in clinic today. Here is what we discussed:   For your arm numbness/tingling in morning - try to sleep on your back and not on your arm/shoulder. If after doing this, things do not improve, please make sure to give us a call.   I am sorry that you are having anxiety - I have sent refills for your fluoxetine and your vitamin d pills. Please make sure to pick those up.   I have also referred you for [facility], they will help set you up with a therapist.   I have also referred you to gynecology to further discuss contraception for your heavy and frequent periods.   I have also referred you to optometry to talk about your eye spasms and blurry vision.     Please see us back in 6 weeks. We wish you the best!  Dr. Sandra Muñiz

## 2024-03-29 NOTE — PROGRESS NOTES
HISTORY OF PRESENT ILLNESS:     Brittany Chavez is a 34 y.o. female with history of anxiety, depression, hidradenitis suppurativa, dysmenorrhea who presents for follow up. She was last seen at the clinic in Nov 2023.     Today she states that her main concerns are irregular periods, morning numbness, and eye spasms.     Irregular periods: has had these before, last saw obgyn in April 2023, was on birth control but she ended up missing a few days of the medication, and then she tried to restart the medication but then felt like it threw off her body and she started having a lot of bleeding so she stopped the birth control. she is no longer on birth control. She does not think she has seen her obgyn after she has stopped the ocps. She states that she has been having periods every 2 weeks for 7 days each time - this was the reason she originally started hormonal pills last years with her obgyn. She states that she does feel like the birth control did initially help. Now she is back to having a period every 2 weeks, but is concerned even more because she feels like her periods are becoming more frequent and lasting longer. Her latest period was from 3/16-3/25. (The period before that was from 2/24-3/1). She has tried depot shots in the past (made her bleeding worse) and has also tried mirena IUD in past.     Morning numbness: when she wakes up her arm feels numb and takes a while to get it to work again. She states that she has either L or R arm numbness/tingling in the morning after she wakes up depending on which side she sleeps on. She states she tries to keep herself moving during the day and so does not really feel any numbness or tingling during the day, she does not feel it right now either.     eye spasms: has not seen an eye doctor, feels like her eyes spasms intermittently daily. No triggers. Typically both eyes, but the L eye bothers her more.       States that she has pretty bad anxiety, stopped taking  fluoxetine and hydroxyzine. She states that she has a lot going on and she also doesn't like taking medications. She would prefer a therapist (was seeing one in [place], but wants a new one). She has a lot weighing on her (family health related things happening).       ROS: 12 point ROS negative unless as per HPI     Patient Active Problem List    Diagnosis Date Noted    Bursitis of shoulder 11/16/2023    Abnormal menses 11/15/2023    Accommodation spasm 11/15/2023    Acute bronchitis 11/15/2023    Acute pain of right knee 11/15/2023    Acute vaginitis 11/15/2023    Anxiety 11/15/2023    Arthralgia 11/15/2023    Atypical chest pain 11/15/2023    Bursitis of left shoulder 11/15/2023    Carbuncle 11/15/2023    Costochondritis 11/15/2023    Depression 11/15/2023    Feeling light headed 11/15/2023    Hematuria 11/15/2023    Hyperopia 11/15/2023    Hyperopia of both eyes with astigmatism 11/15/2023    Ingrowing hair 11/15/2023    Insomnia 11/15/2023    Malpositioned intrauterine device 11/15/2023    Marijuana use 11/15/2023    Mental health problem 11/15/2023    Neck pain 11/15/2023    Nicotine dependence 11/15/2023    Seasonal allergies 11/15/2023    Shoulder pain 11/15/2023    Tendinitis of left shoulder 11/15/2023    Trichomoniasis 11/15/2023    Vaginal discharge 11/15/2023    Viral illness 11/15/2023    Viral URI 11/15/2023    Bacterial vaginosis 11/15/2023    Vulval hidradenitis 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    Vulvar abscess 11/15/2023    Wears eyeglasses 11/15/2023    PTSD (post-traumatic stress disorder) 04/20/2023    Severe cannabis use disorder (CMS/HCC) 04/20/2023    Unspecified mood (affective) disorder (CMS/HCC) 04/20/2023    Current severe episode of major depressive disorder without psychotic features (CMS/[facility]) 03/10/2023    Anxiety disorder, unspecified 03/10/2023    Nicotine use disorder 09/12/2019    MDD (major depressive disorder), recurrent episode, severe (CMS/HCC) 09/11/2019        Current Outpatient  Medications:     albuterol (Ventolin HFA) 90 mcg/actuation inhaler, Inhale 2 puffs every 4 hours if needed for shortness of breath or wheezing., Disp: , Rfl:     clindamycin (Cleocin T) 1 % gel, Apply 1 Application topically 2 times a day., Disp: , Rfl:     fluconazole (Diflucan) 150 mg tablet, Take one tablet today, if symptoms perist after 72 hours take 2nd tablet, Disp: 2 tablet, Rfl: 0    FLUoxetine (PROzac) 20 mg capsule, Take 1 capsule (20 mg) by mouth once daily for 7 days., Disp: 7 capsule, Rfl: 0    FLUoxetine (PROzac) 40 mg capsule, Take 1 capsule (40 mg) by mouth once daily., Disp: 30 capsule, Rfl: 2    hydrOXYzine HCL (Atarax) 25 mg tablet, Take 1 tablet (25 mg) by mouth once daily at bedtime., Disp: 30 tablet, Rfl: 2    ibuprofen 600 mg tablet, Take 1 tablet (600 mg) by mouth every 6 hours if needed for mild pain (1 - 3)., Disp: , Rfl:     melatonin 5 mg tablet, Take 1 tablet (5 mg) by mouth once daily at bedtime., Disp: , Rfl:     norethindrone-e.estradioL-iron (Eden 24 Fe) 1 mg-20 mcg (24)/75 mg (4) tablet, Take 1 tablet by mouth once daily, Disp: 84 tablet, Rfl: 2    propranolol (Inderal) 10 mg tablet, Take 1 tablet (10 mg) by mouth 3 times a day as needed., Disp: , Rfl:     Vitamin D3 50 mcg (2,000 unit) capsule, Take 1 capsule (50 mcg) by mouth early in the morning.., Disp: , Rfl:     No results found for this or any previous visit (from the past 96 hour(s)).       PHYSICAL EXAM:   General: pt is pleasant, cooperative, in NAD  Heart: RRR, no murmur, rub or [place]  Lungs: clear to auscultation bilaterally with good airflow throughout. No wheezes or rhonchi.  Abdomen: soft, nontender, nondistended, no apparent mass  Extremities: no edema bilaterally  Skin: no rash or lesions; warm and dry   Psychiatric: mental status and behavior appropriate for situation   Neurologic: Normal gait and stance. Normal speech character and tone. No apparent focal deficits.       Musculoskeletal: moving all extremities  appropriately.    Assessment and plan:   Brittany Chavez is a 34 year old female with PMHx anxiety, depression, and hidradenitis suppurativa, dysmenorrhea that presents for follow up.     #Depression  #Anxiety  #Panic disorder  #Insomnia  -pt has anxiety  -Was seeing a therapist at [place] and wants a new one - is trying to find a therapist right now   -Has stopped taking fluoxetine 40mg, hydroxyzine, propranolol   Plan:   -Will [facility] to [facility] as she wants to talk to someone   -will restart fluoxetine at 20mg dose (she does not really like taking medication but is willing today)  -consider uptitrating fluoxetine vs adding back hydroxyzine or propranolol at fuv if sx of anxiety persist      #vision change  #blepharospasm  -pt has intermittent vision blurriness   -has had eye spasms (bilateral, but L eye bothers more)  -will refer pt to optometry      #dysmenorrhea  #frequent periods  -pt has periods every 2 weeks, latest period lasted ~10d  -seen gyn in April 2023, was Rx'd hormonal contraception at the time but pt missed a few days of pills and then tried to restart but this led to worsening of her sx (she said it threw off her body, worsened bleeding) and so she stopped taking the pills   -no anemia in September 2023  Plan:   -will request a follow up with gynecology to discuss contraception      #shoulder/arm numbness and tingling  -occurs in AM after waking up  -states that she sleeps on her side   -Xray of L shoulder in May 2023 was normal (has had steroid injection w ortho)   Plan:   -try to change sleeping position - aim to sleep on back      #vulvular HS  -follows with gynecology     #vit D deficiency  - vitamin D 13 on 5/1  - advised pt to continue vit D supplementation      #Health Maintenance  Cancer Screening:  - PAP smear: 4/2023      Cardiovascular Screening:   - Lipid panel:     Endocrine:   - Last HbA1c: 4.9% 5/2023     Infectious disease:  - HIV: neg (2020)  - Syphilis:   - Hepatitis B: neg  (2020)  - Hepatitis C: neg (2020)  - STI screen: neg (2024) - positive for yeast in March 2024 s/p diflucan      Vaccines:   - Influenza:   - Shingles:   - Tdap:   - Pneumonia:   - COVID:      Safety:  - Housing insecurity:   - Food insecurity:   - Firearms in house:   - Seatbelt use:     RTC IN 6 weeks      Sandra Muñiz MD   IM PGY-2

## 2024-03-31 NOTE — PROGRESS NOTES
I reviewed the resident/fellow's documentation and discussed the patient with the resident/fellow. I agree with the resident/fellow's medical decision making as documented in the note.     Alisha Garcia MD MPH

## 2024-04-09 ENCOUNTER — OFFICE VISIT (OUTPATIENT)
Dept: OBSTETRICS AND GYNECOLOGY | Facility: CLINIC | Age: 34
End: 2024-04-09
Payer: COMMERCIAL

## 2024-04-09 VITALS
BODY MASS INDEX: 29.15 KG/M2 | WEIGHT: 154.4 LBS | SYSTOLIC BLOOD PRESSURE: 104 MMHG | DIASTOLIC BLOOD PRESSURE: 60 MMHG | HEIGHT: 61 IN

## 2024-04-09 DIAGNOSIS — N93.9 ABNORMAL UTERINE BLEEDING (AUB): ICD-10-CM

## 2024-04-09 PROCEDURE — 99213 OFFICE O/P EST LOW 20 MIN: CPT | Performed by: ADVANCED PRACTICE MIDWIFE

## 2024-04-09 PROCEDURE — 1036F TOBACCO NON-USER: CPT | Performed by: ADVANCED PRACTICE MIDWIFE

## 2024-04-09 RX ORDER — CLINDAMYCIN PHOSPHATE, BENZOYL PEROXIDE 25; 10 MG/G; MG/G
GEL TOPICAL
COMMUNITY
Start: 2022-12-09

## 2024-04-09 RX ORDER — DROSPIRENONE AND ETHINYL ESTRADIOL 0.03MG-3MG
1 KIT ORAL DAILY
Qty: 84 TABLET | Refills: 3 | Status: SHIPPED | OUTPATIENT
Start: 2024-04-09 | End: 2025-04-09

## 2024-04-09 NOTE — PROGRESS NOTES
"Assessment/Plan   Brittany was seen today for vaginal bleeding.  Diagnoses and all orders for this visit:  Abnormal uterine bleeding (AUB)  -     Referral to Obstetrics / Gynecology  -     drospirenone-ethinyl estradioL (Anamaria, 28,) 3-0.03 mg tablet; Take 1 tablet by mouth once daily.  Ultrasound 5/2023 reviewed  AUB may be due to inconsistent pill use as previously well controlled on regular pill use  Pill changed for HS and improved estrogen support; Screened and appropriate for [facility] use. Risks/benefits/side effects/adverse effects. Instructions of use. Warning S&S reviewed.  Follow up if continued difficulty with pill consistency or AUB persists    Annual exam 1yr    Subjective   Brittany Chavez is a 34 y.o. female who presents for Abnormal bleeding   Frequent and irregular  Onset: 01/11/2024  Home therapies: B/C pills for AUB, reports missing some pills then when started retaking her bleeding remained abnormal with spotting, she has stopped taking them at all  Partner Vasectomy  Declines STI testing  HS/Boil before periods, drains on own    ROS as in HPI    Objective   /60   Ht 1.549 m (5' 1\")   Wt 70 kg (154 lb 6.4 oz)   LMP 04/02/2024   BMI 29.17 kg/m²     Physical Exam  Constitutional:       General: She is not in acute distress.  Cardiovascular:      Rate and Rhythm: Normal rate and regular rhythm.      Heart sounds: Normal heart sounds.   Pulmonary:      Effort: Pulmonary effort is normal.      Breath sounds: Normal breath sounds.   Chest:      Chest wall: No deformity, swelling or tenderness.   Breasts:     Right: Normal.      Left: Normal.   Abdominal:      Palpations: Abdomen is soft. There is no mass.      Tenderness: There is no abdominal tenderness.   Genitourinary:     General: Normal vulva.      Vagina: No vaginal discharge, erythema, tenderness, bleeding or lesions.      Cervix: No cervical motion tenderness, discharge, friability, lesion or cervical bleeding.      Uterus: Normal. Not " enlarged, not fixed and not tender.       Adnexa: Right adnexa normal and left adnexa normal.        Right: No mass, tenderness or fullness.          Left: No mass, tenderness or fullness.        Rectum: Normal. No anal fissure or external hemorrhoid.   Lymphadenopathy:      Upper Body:      Right upper body: No supraclavicular or axillary adenopathy.      Left upper body: No supraclavicular or axillary adenopathy.   Neurological:      Mental Status: She is alert.

## 2024-06-04 ENCOUNTER — APPOINTMENT (OUTPATIENT)
Dept: PRIMARY CARE | Facility: HOSPITAL | Age: 34
End: 2024-06-04
Payer: COMMERCIAL

## 2024-06-04 NOTE — PROGRESS NOTES
Chief complaint:    HPI:  Brittany Chavez is a 34 year old female with PMHx anxiety, depression, and hidradenitis suppurativa, dysmenorrhea that presents for follow up.    She was last seen 3/29/24 at [facility] for irregular periods after stopping OCPs, numbness in her arms when she wakes up, and eye spasms. She was referred to access clinic for her anxiety and re-started on fluoxetine. She was also referred to opthalmology for blepharospasm and OBGYN for dysmenorrhea. She was encouraged to try sleeping on her back to see if this would help her arm numbness.    Since her last visit she saw her OBGYN 4/9/24 for which her AUB was thought to be due to her intermittent medication use and she was told to continue on her OCP.     Today,       Medications:    Current Outpatient Medications:     albuterol (Ventolin HFA) 90 mcg/actuation inhaler, Inhale 2 puffs every 4 hours if needed for shortness of breath or wheezing., Disp: , Rfl:     clindamycin (Cleocin T) 1 % gel, Apply 1 Application topically 2 times a day., Disp: , Rfl:     clindamycin-benzoyl peroxide gel, Clindamycin Phos-Benzoyl Perox 1.2-2.5 % External Gel APPLY A PEA-SIZED AMOUNT TO THE ENTIRE FACE ONCE DAILY AT BEDTIME. Quantity: 1 Refills: 4 Ordered: 9-Dec-2022 Jeremiah ESQUIVELCarlota Start : 9-Dec-2022 Active, Disp: , Rfl:     drospirenone-ethinyl estradioL (Anamaria, 28,) 3-0.03 mg tablet, Take 1 tablet by mouth once daily., Disp: 84 tablet, Rfl: 3    fluconazole (Diflucan) 150 mg tablet, Take one tablet today, if symptoms perist after 72 hours take 2nd tablet, Disp: 2 tablet, Rfl: 0    FLUoxetine (PROzac) 20 mg capsule, Take 1 capsule (20 mg) by mouth once daily., Disp: 30 capsule, Rfl: 11    hydrOXYzine HCL (Atarax) 25 mg tablet, Take 1 tablet (25 mg) by mouth once daily at bedtime., Disp: 30 tablet, Rfl: 2    norethindrone-e.estradioL-iron (Eden 24 Fe) 1 mg-20 mcg (24)/75 mg (4) tablet, Take 1 tablet by mouth once daily, Disp: 84 tablet, Rfl: 2    propranolol (Inderal) 10  mg tablet, Take 1 tablet (10 mg) by mouth 3 times a day as needed., Disp: , Rfl:     Vitamin D3 50 mcg (2,000 unit) capsule, Take 1 capsule (50 mcg) by mouth early in the morning.., Disp: 30 capsule, Rfl: 11    Allergies:  Allergies   Allergen Reactions    Naproxen Other, Rash and Swelling     peeling of skin       Review of systems:  Constitutional: negative for fevers, chills, weight loss, weight gain, change in appetite, fatigue, weakness.  HEENT: negative for headache, changes in vision or hearing, congestion, sore throat.  Respiratory: negative for SOB, cough, hemoptysis, wheezing  Cardiovascular: negative for chest pain, palpitations, orthopnea, PND  GI: negative for dysphagia, abdominal pain, nausea, vomiting, diarrhea, constipation, melena, hematochezia, BRBPR  : negative for frequency, urgency, dysuria, hematuria, incontinence  MSK: negative for myalgia, arthralgia, decreased joint ROM, LE swelling  Skin: negative for rash, wounds  Heme/lymph: negative for easy bruising, bleeding, epistaxis  Neuro: negative for LOC, numbness, tingling, tremor, vertigo, dizziness    Vitals:  There were no vitals filed for this visit.    Physical exam:  Constitutional: Well-developed female in no acute distress.  HEENT: Normocephalic, atraumatic. PERRL. EOMI. No cervical lymphadenopathy.  Respiratory: CTA bilaterally. No wheezes, rales, or rhonchi. Normal respiratory effort.  Cardiovascular: RRR. No murmurs, gallops, or rubs. No JVD. Radial pulses 2+.  Abdominal: Soft, nondistended, nontender to palpation. Bowel sounds present. No hepatosplenomegaly or masses. No CVA tenderness.  Neuro: CN II-XII intact. UE and LE strength 5/5 bilaterally and sensation intact. Normal FTN testing.  MSK: No LE edema bilaterally.  Skin: Warm, dry. No rashes or wounds.  Psych: Appropriate mood and affect.    Labs:  No results found for this or any previous visit (from the past 24 hour(s)).    Imaging:  No results found.    Assessment and  plan:  Brittany Chavez is a 34 year old female with PMHx anxiety, depression, and hidradenitis suppurativa, dysmenorrhea that presents for follow up.    -fluoxetine  -AUB  -overweight?  -smoker?       #Depression  #Anxiety  #Panic disorder  #Insomnia  -pt has anxiety  -Was seeing a therapist at [facility] and wants a new one - is trying to find a therapist right now, referred to access clinic on last visit  -Has stopped taking fluoxetine 40mg, hydroxyzine, propranolol   Plan:   -on fluoxetine at 20mg dose, consider up titration  -consider uptitrating fluoxetine vs adding back hydroxyzine or propranolol at fuv if sx of anxiety persist      #vision change  #blepharospasm  -pt has intermittent vision blurriness   -has had eye spasms (bilateral, but L eye bothers more)  -referred to optho last visit     #dysmenorrhea  #frequent periods  -pt has periods every 2 weeks, latest period lasted ~10d  -saw OBGYN in  who thought AUB was due to medication use and recommended continuing OCPs  -no anemia in 2023  Plan:   -continue OCPs  -fu with OBGYN every year       #shoulder/arm numbness and tingling  -occurs in AM after waking up  -states that she sleeps on her side   -Xray of L shoulder in May 2023 was normal (has had steroid injection w ortho)   Plan:   -try to change sleeping position - aim to sleep on back      #vulvular HS  -follows with gynecology      #vit D deficiency  - vitamin D 13 on   - advised pt to continue vit D supplementation        #Health Maintenance  Cancer Screening:  - PAP smear with co testin2023 negative  - Colonoscopy, mammogram not yet indicated       Cardiovascular Screening:   - Lipid panel 23: Tchol 69, HDL 72, LDL 85, trig 61     Endocrine:   - Last HbA1c: 4.9% 2023     Infectious disease:  - HIV: neg ()  - Syphilis:   - Hepatitis B: neg ()  - Hepatitis C: neg ()  - STI screen: neg () - positive for yeast in 2024 s/p diflucan    Vaccines:   - Influenza:    - Shingles: Not indicated   - Tdap:   - Pneumonia: Not indicated  - COVID:       Follow-up in ***.    Patient and plan discussed with attending physician, Dr. Hirsch.    Gibran Fuentes MD  PGY-1 Internal Medicine  [facility]

## 2024-07-02 ENCOUNTER — APPOINTMENT (OUTPATIENT)
Dept: PRIMARY CARE | Facility: HOSPITAL | Age: 34
End: 2024-07-02
Payer: COMMERCIAL

## 2024-07-12 ENCOUNTER — HOSPITAL ENCOUNTER (OUTPATIENT)
Dept: RADIOLOGY | Facility: CLINIC | Age: 34
Discharge: HOME | End: 2024-07-12
Payer: COMMERCIAL

## 2024-07-12 ENCOUNTER — OFFICE VISIT (OUTPATIENT)
Dept: ORTHOPEDIC SURGERY | Facility: CLINIC | Age: 34
End: 2024-07-12
Payer: COMMERCIAL

## 2024-07-12 DIAGNOSIS — M25.512 ACUTE PAIN OF LEFT SHOULDER: ICD-10-CM

## 2024-07-12 DIAGNOSIS — M54.12 CERVICAL RADICULOPATHY: Primary | ICD-10-CM

## 2024-07-12 PROCEDURE — 73030 X-RAY EXAM OF SHOULDER: CPT | Mod: LT

## 2024-07-12 PROCEDURE — 99214 OFFICE O/P EST MOD 30 MIN: CPT | Performed by: STUDENT IN AN ORGANIZED HEALTH CARE EDUCATION/TRAINING PROGRAM

## 2024-07-12 RX ORDER — METHYLPREDNISOLONE 4 MG/1
4 TABLET ORAL ONCE
Qty: 1 TABLET | Refills: 0 | Status: SHIPPED | OUTPATIENT
Start: 2024-07-12 | End: 2024-07-12

## 2024-07-12 RX ORDER — METHOCARBAMOL 750 MG/1
750 TABLET, FILM COATED ORAL 4 TIMES DAILY
Qty: 40 TABLET | Refills: 0 | Status: SHIPPED | OUTPATIENT
Start: 2024-07-12 | End: 2024-07-22

## 2024-07-24 ENCOUNTER — OFFICE VISIT (OUTPATIENT)
Dept: URGENT CARE | Facility: CLINIC | Age: 34
End: 2024-07-24
Payer: COMMERCIAL

## 2024-07-24 ENCOUNTER — HOSPITAL ENCOUNTER (OUTPATIENT)
Dept: RADIOLOGY | Facility: CLINIC | Age: 34
Discharge: HOME | End: 2024-07-24
Payer: COMMERCIAL

## 2024-07-24 VITALS
OXYGEN SATURATION: 98 % | HEART RATE: 69 BPM | DIASTOLIC BLOOD PRESSURE: 82 MMHG | SYSTOLIC BLOOD PRESSURE: 127 MMHG | RESPIRATION RATE: 20 BRPM | TEMPERATURE: 97.1 F

## 2024-07-24 DIAGNOSIS — M54.2 CERVICALGIA: Primary | ICD-10-CM

## 2024-07-24 DIAGNOSIS — M54.2 CERVICALGIA: ICD-10-CM

## 2024-07-24 PROCEDURE — 72050 X-RAY EXAM NECK SPINE 4/5VWS: CPT

## 2024-07-24 PROCEDURE — 99214 OFFICE O/P EST MOD 30 MIN: CPT | Performed by: PHYSICIAN ASSISTANT

## 2024-07-24 RX ORDER — METHOCARBAMOL 500 MG/1
TABLET, FILM COATED ORAL
Qty: 20 TABLET | Refills: 0 | Status: SHIPPED | OUTPATIENT
Start: 2024-07-24

## 2024-07-24 ASSESSMENT — ENCOUNTER SYMPTOMS
WEAKNESS: 0
CONSTITUTIONAL NEGATIVE: 1
CARDIOVASCULAR NEGATIVE: 1
MYALGIAS: 1
GASTROINTESTINAL NEGATIVE: 1
HEMATOLOGIC/LYMPHATIC NEGATIVE: 1
PSYCHIATRIC NEGATIVE: 1
EYES NEGATIVE: 1
NUMBNESS: 0
NECK PAIN: 1
ALLERGIC/IMMUNOLOGIC NEGATIVE: 1
RESPIRATORY NEGATIVE: 1
ENDOCRINE NEGATIVE: 1

## 2024-07-24 ASSESSMENT — PAIN SCALES - GENERAL: PAINLEVEL: 8

## 2024-07-24 NOTE — PATIENT INSTRUCTIONS
Ice or heat 2-3 times a day  See the spine specialist for continuing symptoms  ER visit anytime 24/7 for acute worsening or changing condition

## 2024-07-24 NOTE — PROGRESS NOTES
Subjective   Patient ID: Brittany Chavez is a 34 y.o. female.      History provided by:  Patient   used: No    Neck Pain  Associated symptoms: no numbness and no weakness    This is a 34 yr old female here for neck pain x 2 months. No fall or injury. Denies radiating arm or leg pain, parasthesias or weakness. Had steroid rx and muscle relaxant rx 1-2 weeks for shoulder pain. Still with neck pain.     Review of Systems   Constitutional: Negative.    HENT: Negative.     Eyes: Negative.    Respiratory: Negative.     Cardiovascular: Negative.    Gastrointestinal: Negative.    Endocrine: Negative.    Genitourinary: Negative.    Musculoskeletal:  Positive for myalgias and neck pain.   Skin: Negative.    Allergic/Immunologic: Negative.    Neurological:  Negative for weakness and numbness.   Hematological: Negative.    Psychiatric/Behavioral: Negative.     All other systems reviewed and are negative.  /82 (BP Location: Left arm, Patient Position: Sitting, BP Cuff Size: Adult long)   Pulse 69   Temp 36.2 °C (97.1 °F)   Resp 20   LMP 07/02/2024   SpO2 98%     Objective   Physical Exam  Vitals and nursing note reviewed.   Constitutional:       Appearance: Normal appearance.   HENT:      Head: Normocephalic and atraumatic.   Cardiovascular:      Rate and Rhythm: Normal rate and regular rhythm.   Pulmonary:      Effort: Pulmonary effort is normal.      Breath sounds: Normal breath sounds.   Musculoskeletal:      Comments:  C spine-no midline cervical vertebral body tenderness with palpation.   Cervical paraspinal pain with palpation bilaterally, limited ROM secondary to pain   Skin:     General: Skin is warm and dry.   Neurological:      General: No focal deficit present.      Mental Status: She is alert and oriented to person, place, and time.   Psychiatric:         Mood and Affect: Mood normal.         Behavior: Behavior normal.     Assessment:  Cervicalgia    Plan:  C spine xray-negative  Ice or  heat tid  Robaxin 500 mg tid, caution drowsiness  Referral to spine center due to chronicity of sxs. Order placed  ER visit anytime 24/7 for acute worsening or changing condition

## 2024-07-25 NOTE — PROGRESS NOTES
Brittany Chavez is a 34 y.o. year-old  female  she is a new patient to our office and presents with a chief complaint of Left shoulder, arm, and neck pain. No injury.  I had seen her previously and have provided a steroid injection in her shoulder about a year or so ago for subacromial bursitis and this helped considerably.  Is now different the pain radiates down into her arm she gets numbness and tingling.      Past Medical, Family, and Social History reviewed     Review of Systems  A complete review of systems was conducted, pertinent only to the HPI noted above.    Physical Exam  GEN: Alert and Oriented x 3  Constitutional: Well appearing, in no apparent distress.  Eyes: sclera anicteric  ENT: hearing appropriate for normal conversation, neck appears symmetric with no gross thyromegaly  Pulm: No labored breathing, no wheezing  CVS: Regular rate and rhythm  PSY: normal mood and affect  Skin: No rashes, erythema, or induration around shoulder     Focused Musculoskeletal Exam:     Side: Left shoulder:  PROM:   FE (170)   ER 60 ABER/ABIR: 90/90  AROM:   FE (170)   ER 45 IR T8  Strength:  Supra [5/5] Infra [5/5] Subscap [5/5]  Abd [5/5]    Special Tests  Shoulder  Neg  neer and selma, +spurlings.,       ACJ:  AC TTP: [neg]  Cross Arm [neg]  AC prominence [no]      [Sensation intact Ax/median/ulnar/radial distributions  Motor intact Ax/median/radial/ulnar/AIN/PIN    X-rays of the shoulder independently viewed and interpreted: No significant degenerative change of the shoulder    The patient history, physical examination and imaging studies are consistent with the aforementioned assessment. I explained to the patient that I believe that the majority of the symptoms are from the cervical spine rather than the shoulder. The symptoms including paresthesias and radiating pain into the hand are not consistent with shoulder pathology. Given the history, physical examination and radiographic findings, I believe that a more  formal evaluation of the cervical spine is necessary. In this regard, I have referred the patient to the [facility] for consideration of epidural injection.  I have prescribed an oral steroid taper as well as Robaxin. They will follow-up with me on a PRN basis. All questions were answered. The patient acknowledged the explanation, agreed to proceed with the plan

## 2024-07-26 ENCOUNTER — TELEPHONE (OUTPATIENT)
Dept: TRANSPLANT | Facility: HOSPITAL | Age: 34
End: 2024-07-26
Payer: COMMERCIAL

## 2024-07-29 ENCOUNTER — EDUCATION (OUTPATIENT)
Dept: TRANSPLANT | Facility: HOSPITAL | Age: 34
End: 2024-07-29
Payer: COMMERCIAL

## 2024-07-29 ENCOUNTER — DOCUMENTATION (OUTPATIENT)
Dept: TRANSPLANT | Facility: HOSPITAL | Age: 34
End: 2024-07-29
Payer: COMMERCIAL

## 2024-07-29 DIAGNOSIS — Z52.4 DONOR OF KIDNEY FOR TRANSPLANT: Primary | ICD-10-CM

## 2024-07-29 NOTE — TELEPHONE ENCOUNTER
Call placed to Brittany to review referral.  She denies CABG, stents. Or hx of seizures as she notes this was entered in error. She states filled it out quickly. Okay to schedule edu. She chose 7/29 3pm.  Kristyn notified.

## 2024-07-29 NOTE — PROGRESS NOTES
Patient received education regarding the following topics as part of their living donor evaluation:  The evaluation process, including:  ·     Transplant team members and roles   ·     Required consultations and testing  ·     Selection criteria and suitability for donation  ·     Psychosocial and financial considerations for a successful transplant  ·     Patient responsibilities, including the necessity of adhering to a strict medical regimen  An overview of the surgical procedure   Potential medical, surgical, and psychosocial risks to transplantation, including:  ·     Wound infection  ·     Pneumonia  ·     Blood clot formation  ·     Complications with remaining kidney including kidney failure and need for dialysis or kidney transplant  ·     Arrhythmias and cardiovascular collapse  ·     Multi-organ system failure  ·     Death  ·     Depression  ·     Post-Traumatic Stress Disorder  ·     Generalized anxiety, issues of dependence, and feelings of guilt  Available alternatives to transplantation  National and [facility]plant [place] outcomes for one-year patient and graft-survival from the most recent [facility] program-specific report.   Donor risk factors that could affect the success of the transplant and the health of the patient, including:  ·     Donor age  ·     Donor medical and social history  ·     Condition of the organ  ·     Risk of murali cancer, HIV, Hepatitis B, Hepatitis C, or malaria if the infection is not detectable at the time of donation  Patient's right to withdraw consent for donation at any time during the process. Patient's consent to donate willingly and without pressure (coercion) from anyone, and will not receive payment or financial reward for donating organ.  Transplants not performed in a Medicare-approved transplant center could affect the patient's ability to have immunosuppression medication paid for under Medicare part B.   Multiple listing options.     Patient was given the  opportunity to have questions answered and understands that the living donor team will be available to assist the patient throughout the entire donation process. Patient was provided a copy of the signed informed consent for living donor donation.     Signed evaluation informed consent received? 07/29/2024

## 2024-07-30 ENCOUNTER — TELEPHONE (OUTPATIENT)
Dept: TRANSPLANT | Facility: HOSPITAL | Age: 34
End: 2024-07-30
Payer: COMMERCIAL

## 2024-07-30 DIAGNOSIS — Z52.4 DONOR OF KIDNEY FOR TRANSPLANT: Primary | ICD-10-CM

## 2024-07-30 NOTE — PROGRESS NOTES
"It was a pleasure to see Ms. Chavez at the [facility] at [facility].     She is a really nice 34 y.o. female  who presents to us with complaints {pain location:60866::\"primarily axial LOWER BACK pain \"} that started about  {1-12:16440} {time; units:23509} ago, and have been {course:17::\"unchanged\"} since that time.  The symptoms started after {causes; back pain:17188::\"no known injury\"}    The pain is {NUMBERS 0-10:80099} /10. The pain is described as {pain quality:36487} and occurs {timin}.  Symptoms are exacerbated by {causes; aggravators pain back:88290}. Factors which relieve the pain include {alleviated by:68792}      Numbness and/or tingling - {YesOrNo:84199::\"NO\"}    Weakness : {YesOrNo:28571::\"NO\"}    Bladder/Bowel symptoms - {YesOrNo:24218::\"NO\"}    The patient has tried medications (eg: gabapentin, NSAIDS and narcotics ) : {yes/no:56730}  PT : {YES/DATE/NO:47381}  Pain Management with ESIs/selective nerve blocks  - {YesOrNo:75713::\"NO\"}    she is a {MKmedicalrisk:37582::\"NON-SMOKER\",\"NON-DIABETIC\"}    History of Depression : {YesOrNo:30255::\"NO\"}    PRIOR SPINE SURGERY: {YesOrNo:80162::\"NO\"}    Denies use of Aspirin, Coumadin, or Plavix or any other anticoagulants     NARCOTICS for pain : {YesOrNo:26083::\"NO\"}    Part of this patient’s history is from personal review of the patient’s previous charts.      Past Medical History:   Diagnosis Date    Chlamydial infection, unspecified     Chlamydia    Dysuria 10/27/2014    Dysuria    Encounter for fitting and adjustment of spectacles and contact lenses     Contact lens/glasses fitting    Encounter for gynecological examination (general) (routine) without abnormal findings 05/15/2015    Well woman exam    Encounter for insertion of intrauterine contraceptive device     Encounter for insertion of mirena IUD    Encounter for insertion of intrauterine contraceptive device     Encounter for insertion of mirena IUD    Encounter for " supervision of normal first pregnancy, unspecified trimester ([facility] [address])     Primigravida    Other conditions influencing health status     History of pregnancy    Other problems related to lifestyle 07/12/2016    Other problems related to lifestyle    Other specified health status     Last menstrual period (LMP) > 10 days ago    Other specified postprocedural states     Required emergent intubation    Personal history of other diseases of the nervous system and sense organs     History of sleep disturbance    Personal history of other diseases of the respiratory system     Personal history of asthma    Personal history of other infectious and parasitic diseases     History of sexually transmitted disease    Personal history of other mental and behavioral disorders     History of depression    Type A blood, Rh negative     Blood type A-    Urogenital trichomoniasis, unspecified     Trichs - trichomonas vaginalis infection           Current Outpatient Medications:     albuterol (Ventolin HFA) 90 mcg/actuation inhaler, Inhale 2 puffs every 4 hours if needed for shortness of breath or wheezing., Disp: , Rfl:     clindamycin (Cleocin T) 1 % gel, Apply 1 Application topically 2 times a day., Disp: , Rfl:     clindamycin-benzoyl peroxide gel, Clindamycin Phos-Benzoyl Perox 1.2-2.5 % External Gel APPLY A PEA-SIZED AMOUNT TO THE ENTIRE FACE ONCE DAILY AT BEDTIME. Quantity: 1 Refills: 4 Ordered: 9-Dec-2022 Carlota Daniels DO Start : 9-Dec-2022 Active, Disp: , Rfl:     drospirenone-ethinyl estradioL (Anamaria, 28,) 3-0.03 mg tablet, Take 1 tablet by mouth once daily., Disp: 84 tablet, Rfl: 3    fluconazole (Diflucan) 150 mg tablet, Take one tablet today, if symptoms perist after 72 hours take 2nd tablet, Disp: 2 tablet, Rfl: 0    FLUoxetine (PROzac) 20 mg capsule, Take 1 capsule (20 mg) by mouth once daily., Disp: 30 capsule, Rfl: 11    hydrOXYzine HCL (Atarax) 25 mg tablet, Take 1 tablet (25 mg) by mouth once daily at bedtime.,  Disp: 30 tablet, Rfl: 2    methocarbamol (Robaxin) 500 mg tablet, One po tid. May make drowsy, Disp: 20 tablet, Rfl: 0    methocarbamol (Robaxin) 750 mg tablet, Take 1 tablet (750 mg) by mouth 4 times a day for 10 days., Disp: 40 tablet, Rfl: 0    norethindrone-e.estradioL-iron (Eden 24 Fe) 1 mg-20 mcg (24)/75 mg (4) tablet, Take 1 tablet by mouth once daily, Disp: 84 tablet, Rfl: 2    propranolol (Inderal) 10 mg tablet, Take 1 tablet (10 mg) by mouth 3 times a day as needed., Disp: , Rfl:     Vitamin D3 50 mcg (2,000 unit) capsule, Take 1 capsule (50 mcg) by mouth early in the morning.., Disp: 30 capsule, Rfl: 11      Review of Systems :         EXAM:   There were no vitals filed for this visit.      IMAGING:   ***    ASSESSMENT AND PLAN:  ***        Luisito Anderson MD, [facility]   of Neurological Surgery  [employer]  Attending Surgeon  Director - Minimally Invasive Spine Surgery  [place], [place]      Some of this note was completed using Dragon voice recognition technology and sometimes the software misinterprets words. This may include unintended errors with respect to translation of words, typographical errors or grammar errors which may not have been identified prior to finalization of the chart note. Please take this into account when reading this note

## 2024-08-01 ENCOUNTER — APPOINTMENT (OUTPATIENT)
Dept: NEUROSURGERY | Facility: CLINIC | Age: 34
End: 2024-08-01
Payer: COMMERCIAL

## 2024-08-12 ENCOUNTER — LAB (OUTPATIENT)
Dept: LAB | Facility: LAB | Age: 34
End: 2024-08-12
Payer: COMMERCIAL

## 2024-08-12 DIAGNOSIS — Z52.4 DONOR OF KIDNEY FOR TRANSPLANT: ICD-10-CM

## 2024-08-12 LAB
ABO GROUP (TYPE) IN BLOOD: NORMAL
ALBUMIN SERPL BCP-MCNC: 3.9 G/DL (ref 3.4–5)
ALP SERPL-CCNC: 32 U/L (ref 33–110)
ALT SERPL W P-5'-P-CCNC: 9 U/L (ref 7–45)
ANION GAP SERPL CALC-SCNC: 12 MMOL/L (ref 10–20)
APTT PPP: 30 SECONDS (ref 27–38)
AST SERPL W P-5'-P-CCNC: 14 U/L (ref 9–39)
BILIRUB SERPL-MCNC: 0.3 MG/DL (ref 0–1.2)
BUN SERPL-MCNC: 10 MG/DL (ref 6–23)
CALCIUM SERPL-MCNC: 9.4 MG/DL (ref 8.6–10.6)
CHLORIDE SERPL-SCNC: 106 MMOL/L (ref 98–107)
CHOLEST SERPL-MCNC: 173 MG/DL (ref 0–199)
CHOLESTEROL/HDL RATIO: 2.1
CMV IGG AVIDITY SERPL IA-RTO: REACTIVE %
CO2 SERPL-SCNC: 25 MMOL/L (ref 21–32)
CREAT SERPL-MCNC: 0.69 MG/DL (ref 0.5–1.05)
EBV EA IGG SER QL: NEGATIVE
EBV NA AB SER QL: POSITIVE
EBV VCA IGG SER IA-ACNC: POSITIVE
EBV VCA IGM SER IA-ACNC: NEGATIVE
EGFRCR SERPLBLD CKD-EPI 2021: >90 ML/MIN/1.73M*2
ERYTHROCYTE [DISTWIDTH] IN BLOOD BY AUTOMATED COUNT: 12.7 % (ref 11.5–14.5)
EST. AVERAGE GLUCOSE BLD GHB EST-MCNC: 91 MG/DL
GLUCOSE P FAST SERPL-MCNC: 88 MG/DL (ref 74–99)
GLUCOSE SERPL-MCNC: 88 MG/DL (ref 74–99)
HBA1C MFR BLD: 4.8 %
HBV CORE IGM SER QL: NONREACTIVE
HBV SURFACE AB SER-ACNC: <3.1 MIU/ML
HBV SURFACE AG SERPL QL IA: NONREACTIVE
HCT VFR BLD AUTO: 36.9 % (ref 36–46)
HCV AB SER QL: NONREACTIVE
HDLC SERPL-MCNC: 80.5 MG/DL
HGB BLD-MCNC: 11.5 G/DL (ref 12–16)
HIV 1+2 AB+HIV1 P24 AG SERPL QL IA: NONREACTIVE
INR PPP: 0.9 (ref 0.9–1.1)
LDLC SERPL CALC-MCNC: 78 MG/DL
MCH RBC QN AUTO: 26.4 PG (ref 26–34)
MCHC RBC AUTO-ENTMCNC: 31.2 G/DL (ref 32–36)
MCV RBC AUTO: 85 FL (ref 80–100)
NON HDL CHOLESTEROL: 93 MG/DL (ref 0–149)
NRBC BLD-RTO: 0 /100 WBCS (ref 0–0)
PHOSPHATE SERPL-MCNC: 4 MG/DL (ref 2.5–4.9)
PLATELET # BLD AUTO: 315 X10*3/UL (ref 150–450)
POTASSIUM SERPL-SCNC: 4.5 MMOL/L (ref 3.5–5.3)
PROT SERPL-MCNC: 6.7 G/DL (ref 6.4–8.2)
PROTHROMBIN TIME: 10.2 SECONDS (ref 9.8–12.8)
RBC # BLD AUTO: 4.36 X10*6/UL (ref 4–5.2)
RH FACTOR (ANTIGEN D): NORMAL
SODIUM SERPL-SCNC: 138 MMOL/L (ref 136–145)
TREPONEMA PALLIDUM IGG+IGM AB [PRESENCE] IN SERUM OR PLASMA BY IMMUNOASSAY: NONREACTIVE
TRIGL SERPL-MCNC: 72 MG/DL (ref 0–149)
URATE SERPL-MCNC: 4 MG/DL (ref 2.3–6.7)
VLDL: 14 MG/DL (ref 0–40)
WBC # BLD AUTO: 4.1 X10*3/UL (ref 4.4–11.3)

## 2024-08-12 PROCEDURE — 81379 HLA I TYPING COMPLETE HR: CPT | Mod: OUT | Performed by: SURGERY

## 2024-08-13 LAB
CYSTATIN C SERPL-MCNC: 0.6 MG/L (ref 0.5–1.2)
GFR/BSA.PRED SERPLBLD CYS-BASED-ARV: 125 ML/MIN/BSA

## 2024-08-14 LAB
CMV IGM SERPL-ACNC: 15.8 AU/ML
PATIENT PHENOTYPE: NORMAL
WNV IGG SER IA-ACNC: 0.07 IV
WNV IGM SER IA-ACNC: 0 IV

## 2024-08-15 ENCOUNTER — APPOINTMENT (OUTPATIENT)
Dept: NEUROSURGERY | Facility: CLINIC | Age: 34
End: 2024-08-15
Payer: COMMERCIAL

## 2024-08-15 LAB
NIL(NEG) CONTROL SPOT COUNT: NORMAL
PANEL A SPOT COUNT: 0
PANEL B SPOT COUNT: 0
POS CONTROL SPOT COUNT: NORMAL
T-SPOT. TB INTERPRETATION: NEGATIVE

## 2024-08-19 ENCOUNTER — OFFICE VISIT (OUTPATIENT)
Dept: ORTHOPEDIC SURGERY | Facility: CLINIC | Age: 34
End: 2024-08-19
Payer: COMMERCIAL

## 2024-08-19 ENCOUNTER — LAB (OUTPATIENT)
Dept: LAB | Facility: LAB | Age: 34
End: 2024-08-19
Payer: COMMERCIAL

## 2024-08-19 DIAGNOSIS — Z52.4 DONOR OF KIDNEY FOR TRANSPLANT: ICD-10-CM

## 2024-08-19 DIAGNOSIS — S16.1XXA CERVICAL STRAIN, ACUTE, INITIAL ENCOUNTER: Primary | ICD-10-CM

## 2024-08-19 DIAGNOSIS — M54.2 CERVICALGIA: ICD-10-CM

## 2024-08-19 LAB
ALBUMIN (MG/24HR) IN 24 HOUR URINE: NORMAL
AMPHETAMINES UR QL SCN: ABNORMAL
APPEARANCE UR: CLEAR
BARBITURATES UR QL SCN: ABNORMAL
BENZODIAZ UR QL SCN: ABNORMAL
BILIRUB UR STRIP.AUTO-MCNC: NEGATIVE MG/DL
BZE UR QL SCN: ABNORMAL
CANNABINOIDS UR QL SCN: ABNORMAL
COLLECT DURATION TIME SPEC: 24 HRS
COLLECT DURATION TIME SPEC: 24 HRS
COLOR UR: NORMAL
CREAT 24H UR-MCNC: 62.3 MG/DL (ref 20–320)
CREAT 24H UR-MCNC: 62.3 MG/DL (ref 20–320)
CREAT 24H UR-MRATE: 1.51 G/24 H (ref 0.67–1.59)
CREAT 24H UR-MRATE: 1.51 G/24 H (ref 0.67–1.59)
CREAT CL 24H UR+SERPL-VRATE: 152 ML/MIN
CREAT SERPL-MCNC: 0.69 MG/DL (ref 0.5–1.05)
CREAT UR-MCNC: 92.8 MG/DL (ref 20–320)
CREAT UR-MCNC: 94.2 MG/DL (ref 20–320)
EGFRCR SERPLBLD CKD-EPI 2021: >90 ML/MIN/1.73M*2
FENTANYL+NORFENTANYL UR QL SCN: ABNORMAL
GLUCOSE UR STRIP.AUTO-MCNC: NORMAL MG/DL
HCG UR QL IA.RAPID: NEGATIVE
KETONES UR STRIP.AUTO-MCNC: NEGATIVE MG/DL
LEUKOCYTE ESTERASE UR QL STRIP.AUTO: NEGATIVE
METHADONE UR QL SCN: ABNORMAL
MICROALBUMIN PANEL 24H UR: <7 MG/L
MICROALBUMIN UR-MCNC: <7 MG/L
MICROALBUMIN/CREAT UR: NORMAL MG/G{CREAT}
NITRITE UR QL STRIP.AUTO: NEGATIVE
OPIATES UR QL SCN: ABNORMAL
OXYCODONE+OXYMORPHONE UR QL SCN: ABNORMAL
PCP UR QL SCN: ABNORMAL
PH UR STRIP.AUTO: 6.5 [PH]
PROT UR STRIP.AUTO-MCNC: NEGATIVE MG/DL
PROT UR-ACNC: 5 MG/DL (ref 5–24)
PROT/CREAT UR: 0.05 MG/MG CREAT (ref 0–0.17)
RBC # UR STRIP.AUTO: NEGATIVE /UL
SP GR UR STRIP.AUTO: 1.02
SPECIMEN VOL 24H UR: 2417 ML
SPECIMEN VOL 24H UR: 2417 ML
UROBILINOGEN UR STRIP.AUTO-MCNC: NORMAL MG/DL

## 2024-08-19 PROCEDURE — 99214 OFFICE O/P EST MOD 30 MIN: CPT | Performed by: ORTHOPAEDIC SURGERY

## 2024-08-19 RX ORDER — CYCLOBENZAPRINE HCL 5 MG
5 TABLET ORAL 3 TIMES DAILY PRN
Qty: 90 TABLET | Refills: 0 | Status: SHIPPED | OUTPATIENT
Start: 2024-08-19 | End: 2024-09-18

## 2024-08-19 NOTE — PROGRESS NOTES
Chief Complaint: Cervical Neck Pain     HPI: Brittany Chavez is a 34 y.o. year old female patient with PMH significant for Anxiety, Depression, Cannabis Use who presents with 2 months of neck pain. Denies injury or inciting event. She remembers sleeping and waking up with the neck pain. Patient states onset was insidious. She describes the pain as a dull ache with sudden sharp pains that is exacerbated by movement.  The pain does often keep her from sleeping. She has tried activity modification, robaxin. She has never had formal PT for her neck. She has been limiting movement of her neck. She does follow w/ Dr. Mcdonald for pain in her L shoulder for which she had a CSI.     Patient works as a [employer] drop . Denies work related injury. She plans to be a living kidney donor for her father in the upcoming months.     Review of Systems    All other systems have been reviewed and are negative for complaint. All pertinent positive and negative as listed in history of present illness.    Past Medical History:   Diagnosis Date    Chlamydial infection, unspecified     Chlamydia    Dysuria 10/27/2014    Dysuria    Encounter for fitting and adjustment of spectacles and contact lenses     Contact lens/glasses fitting    Encounter for gynecological examination (general) (routine) without abnormal findings 05/15/2015    Well woman exam    Encounter for insertion of intrauterine contraceptive device     Encounter for insertion of mirena IUD    Encounter for insertion of intrauterine contraceptive device     Encounter for insertion of mirena IUD    Encounter for supervision of normal first pregnancy, unspecified trimester ([facility])     Primigravida    Other conditions influencing health status     History of pregnancy    Other problems related to lifestyle 07/12/2016    Other problems related to lifestyle    Other specified health status     Last menstrual period (LMP) > 10 days ago    Other specified postprocedural states      Required emergent intubation    Personal history of other diseases of the nervous system and sense organs     History of sleep disturbance    Personal history of other diseases of the respiratory system     Personal history of asthma    Personal history of other infectious and parasitic diseases     History of sexually transmitted disease    Personal history of other mental and behavioral disorders     History of depression    Type A blood, Rh negative     Blood type A-    Urogenital trichomoniasis, unspecified     Trichs - trichomonas vaginalis infection        Past Surgical History:   Procedure Laterality Date    OTHER SURGICAL HISTORY  09/29/2014    Dental Surgery    OTHER SURGICAL HISTORY  10/12/2018    Hysteroscopy With Removal Of Impacted Foreign Body        Allergies   Allergen Reactions    Naproxen Other, Rash and Swelling     peeling of skin        Current Outpatient Medications on File Prior to Visit   Medication Sig Dispense Refill    albuterol (Ventolin HFA) 90 mcg/actuation inhaler Inhale 2 puffs every 4 hours if needed for shortness of breath or wheezing.      clindamycin (Cleocin T) 1 % gel Apply 1 Application topically 2 times a day.      clindamycin-benzoyl peroxide gel Clindamycin Phos-Benzoyl Perox 1.2-2.5 % External Gel APPLY A PEA-SIZED AMOUNT TO THE ENTIRE FACE ONCE DAILY AT BEDTIME. Quantity: 1 Refills: 4 Ordered: 9-Dec-2022 Carlota Daniels DO Start : 9-Dec-2022 Active      drospirenone-ethinyl estradioL (Anamaria, 28,) 3-0.03 mg tablet Take 1 tablet by mouth once daily. 84 tablet 3    fluconazole (Diflucan) 150 mg tablet Take one tablet today, if symptoms perist after 72 hours take 2nd tablet 2 tablet 0    FLUoxetine (PROzac) 20 mg capsule Take 1 capsule (20 mg) by mouth once daily. 30 capsule 11    hydrOXYzine HCL (Atarax) 25 mg tablet Take 1 tablet (25 mg) by mouth once daily at bedtime. 30 tablet 2    methocarbamol (Robaxin) 500 mg tablet One po tid. May make drowsy 20 tablet 0     methocarbamol (Robaxin) 750 mg tablet Take 1 tablet (750 mg) by mouth 4 times a day for 10 days. 40 tablet 0    norethindrone-e.estradioL-iron (Eden 24 Fe) 1 mg-20 mcg (24)/75 mg (4) tablet Take 1 tablet by mouth once daily 84 tablet 2    propranolol (Inderal) 10 mg tablet Take 1 tablet (10 mg) by mouth 3 times a day as needed.      Vitamin D3 50 mcg (2,000 unit) capsule Take 1 capsule (50 mcg) by mouth early in the morning.. 30 capsule 11     No current facility-administered medications on file prior to visit.            PE:   General: Patient appears well-nourished and well-developed in no acute distress, Alert and Oriented x3  Psych: Pleasant mood and affect  HEENT: Extraocular muscles intact, pupils equal and round. Sclerae anicteric   Cardio: extremities warm and well perfused  Resp: unlabored symmetric breathing  Skin: no open wounds or rash  Musculoskeletal/Neuro Exam: Normal gait. Mild tenderness to palpation along the paraspinal musculature.  Decreased cervical range of motion 2/2 pain. Negative Spurlings. Negative Lhermitte's Sign    Upper extremity:    Motor: Right upper extremity was 5 out of 5 strength with shoulder abduction, elbow flexion and extension, wrist flexion and extension and  against resistance  Left upper extremity with 5 out of 5 strength with shoulder abduction, elbow flexion and extension, wrist flexion and extension and  against resistance    Sensation to light touch intact along C5-T1 distribution bilaterally    Reflex: 2+ brachioradialis and biceps bilaterally    Upper Motor Signs: Negative Yuri sign bilaterally    Lower extremity    Motor: Right leg with 5 out of 5 motor strength with hip flexion, knee extension, ankle dorsiflexion plantarflexion EHL against resistance  Left leg with 5 out of 5 motor strength with hip flexion, knee extension, ankle dorsiflexion plantarflexion EHL against resistance    Sensation to light touch intact along L2-S1 distribution  bilaterally          Imaging:    I personally reviewed xray of the cervical spine obtained in clinic today. AP, Lateral, flexion and extension xrays were reviewed. No evidence of acute fracture or dislocation. Well preserved disc height throughout. No spondylolisthesis. No dynamic instability with flexion and extension view.       A/P: Brittany Chavez is a 34 y.o. year old female patient with 2 months of cervical neck pain without radicular symptoms, likely cervical strain. Her exam is reassuring with normal cervical spine x-rays. Her pain is most likely due to a muscle strain. She would benefit from a short course of physical therapy. She was provided with a prescription for flexeril as well. She will discuss with her nephrologist regarding any contraindications for medications given her plans for kidney donation.    She may follow-up as needed should her symptoms worsen or persist. After our discussion, the patient articulated understanding of the plan and felt that all questions had been answered satisfactorily. The patient was pleased with the visit and very appreciative for the care rendered.    **Please excuse any errors in grammar or translation related to this dictation. Voice recognition software was utilized to prepare this document. **        Ernestina Terry MD    Department of Orthopaedic Surgery  [facility]  [email or web address]

## 2024-08-20 ENCOUNTER — TELEPHONE (OUTPATIENT)
Dept: TRANSPLANT | Facility: HOSPITAL | Age: 34
End: 2024-08-20
Payer: COMMERCIAL

## 2024-08-20 LAB
COLLECT DURATION TIME SPEC: 24 HRS
CREAT 24H UR-MCNC: 94.6 MG/DL (ref 20–320)
CREAT 24H UR-MRATE: 2.29 G/24 H (ref 0.67–1.59)
PROT 24H UR-MCNC: <4 MG/DL (ref 5–24)
PROT 24H UR-MRATE: ABNORMAL G/(24.H)
SPECIMEN VOL 24H UR: 2417 ML

## 2024-08-20 NOTE — TELEPHONE ENCOUNTER
Brittany called to reschedule her evaluation appointments for this Friday the 23 rd. She has to work and unable to get off in time.

## 2024-08-23 ENCOUNTER — DOCUMENTATION (OUTPATIENT)
Dept: TRANSPLANT | Facility: HOSPITAL | Age: 34
End: 2024-08-23
Payer: COMMERCIAL

## 2024-08-23 ENCOUNTER — APPOINTMENT (OUTPATIENT)
Dept: TRANSPLANT | Facility: HOSPITAL | Age: 34
End: 2024-08-23
Payer: SUBSIDIZED

## 2024-08-23 ENCOUNTER — HOSPITAL ENCOUNTER (OUTPATIENT)
Dept: RADIOLOGY | Facility: HOSPITAL | Age: 34
Discharge: HOME | End: 2024-08-23
Payer: SUBSIDIZED

## 2024-08-23 ENCOUNTER — APPOINTMENT (OUTPATIENT)
Dept: RADIOLOGY | Facility: HOSPITAL | Age: 34
End: 2024-08-23
Payer: SUBSIDIZED

## 2024-08-23 ENCOUNTER — SOCIAL WORK (OUTPATIENT)
Dept: TRANSPLANT | Facility: HOSPITAL | Age: 34
End: 2024-08-23
Payer: SUBSIDIZED

## 2024-08-23 ENCOUNTER — CONSULT (OUTPATIENT)
Dept: TRANSPLANT | Facility: HOSPITAL | Age: 34
End: 2024-08-23
Payer: SUBSIDIZED

## 2024-08-23 ENCOUNTER — OFFICE VISIT (OUTPATIENT)
Dept: TRANSPLANT | Facility: HOSPITAL | Age: 34
End: 2024-08-23
Payer: SUBSIDIZED

## 2024-08-23 ENCOUNTER — HOSPITAL ENCOUNTER (OUTPATIENT)
Dept: CARDIOLOGY | Facility: HOSPITAL | Age: 34
Discharge: HOME | End: 2024-08-23
Payer: SUBSIDIZED

## 2024-08-23 VITALS
HEIGHT: 62 IN | OXYGEN SATURATION: 97 % | HEART RATE: 75 BPM | DIASTOLIC BLOOD PRESSURE: 75 MMHG | SYSTOLIC BLOOD PRESSURE: 118 MMHG | WEIGHT: 150.5 LBS | BODY MASS INDEX: 27.7 KG/M2 | TEMPERATURE: 97.8 F

## 2024-08-23 VITALS
OXYGEN SATURATION: 97 % | HEIGHT: 62 IN | BODY MASS INDEX: 27.7 KG/M2 | WEIGHT: 150.5 LBS | DIASTOLIC BLOOD PRESSURE: 75 MMHG | SYSTOLIC BLOOD PRESSURE: 118 MMHG | HEART RATE: 75 BPM | TEMPERATURE: 97.8 F

## 2024-08-23 DIAGNOSIS — Z52.4 DONOR OF KIDNEY FOR TRANSPLANT: ICD-10-CM

## 2024-08-23 DIAGNOSIS — Z00.5 ENCOUNTER FOR EVALUATION TO BE TRANSPLANT DONOR: Primary | ICD-10-CM

## 2024-08-23 DIAGNOSIS — Z01.818 ENCOUNTER FOR PREOPERATIVE EVALUATION OF LIVING DONOR OF KIDNEY: Primary | ICD-10-CM

## 2024-08-23 LAB — CARBOXYTHC UR-MCNC: 91 NG/ML

## 2024-08-23 PROCEDURE — 99214 OFFICE O/P EST MOD 30 MIN: CPT | Mod: 25 | Performed by: STUDENT IN AN ORGANIZED HEALTH CARE EDUCATION/TRAINING PROGRAM

## 2024-08-23 PROCEDURE — 93005 ELECTROCARDIOGRAM TRACING: CPT

## 2024-08-23 PROCEDURE — 99214 OFFICE O/P EST MOD 30 MIN: CPT | Performed by: STUDENT IN AN ORGANIZED HEALTH CARE EDUCATION/TRAINING PROGRAM

## 2024-08-23 PROCEDURE — 71046 X-RAY EXAM CHEST 2 VIEWS: CPT

## 2024-08-23 PROCEDURE — 99214 OFFICE O/P EST MOD 30 MIN: CPT

## 2024-08-23 PROCEDURE — 74174 CTA ABD&PLVS W/CONTRAST: CPT

## 2024-08-23 PROCEDURE — 2550000001 HC RX 255 CONTRASTS: Performed by: SURGERY

## 2024-08-23 PROCEDURE — 3008F BODY MASS INDEX DOCD: CPT | Performed by: STUDENT IN AN ORGANIZED HEALTH CARE EDUCATION/TRAINING PROGRAM

## 2024-08-23 ASSESSMENT — ENCOUNTER SYMPTOMS
FLANK PAIN: 0
RESPIRATORY NEGATIVE: 1
NEUROLOGICAL NEGATIVE: 1
CARDIOVASCULAR NEGATIVE: 1
POLYDIPSIA: 0
DYSURIA: 0
POLYPHAGIA: 0
COUGH: 0
SHORTNESS OF BREATH: 0
HEMATURIA: 0
CHEST TIGHTNESS: 0
CONSTIPATION: 0
BLOOD IN STOOL: 0
HEMATOLOGIC/LYMPHATIC NEGATIVE: 1
ABDOMINAL DISTENTION: 0
PSYCHIATRIC NEGATIVE: 1

## 2024-08-23 ASSESSMENT — PAIN SCALES - GENERAL
PAINLEVEL: 0-NO PAIN
PAINLEVEL: 0-NO PAIN

## 2024-08-23 ASSESSMENT — PATIENT HEALTH QUESTIONNAIRE - PHQ9
2. FEELING DOWN, DEPRESSED OR HOPELESS: NOT AT ALL
1. LITTLE INTEREST OR PLEASURE IN DOING THINGS: NOT AT ALL
SUM OF ALL RESPONSES TO PHQ9 QUESTIONS 1 AND 2: 0

## 2024-08-23 ASSESSMENT — COLUMBIA-SUICIDE SEVERITY RATING SCALE - C-SSRS: 1. IN THE PAST MONTH, HAVE YOU WISHED YOU WERE DEAD OR WISHED YOU COULD GO TO SLEEP AND NOT WAKE UP?: NO

## 2024-08-23 NOTE — PROGRESS NOTES
Live Kidney Donor Evaluation Office Visit    Chief Complaint: Patient presents for Live Kidney Donor evaluation    History of Present Illness:  Brittany Chavez is a 34 y.o. female presents for a Live Kidney Donor evaluation. She is willfully coming forward to donate to her dad who is in evaluation with us.     She denies any known history of cardiovascular disease including hypertension/diabetes, recurrent UTIs or recurrent kidney stones. She is a non-smoker.     No prior abdominal operations. Vaginal birth. Had an epidural at the time and required epidural blood patch.    Donating to: Family - Dad  Prior Abdominal Operations: No   Relevant Co-morbidities: No  Prior Pregnancy: YES vaginal birth  BMI: Body mass index is 27.98 kg/m².    Review of Systems:  Cardiac: Denies chest pain, palpitations  : Normal urine output. Denies history of gross hematuria, nephrolithiasis, urinary retention, or recurrent UTIs.  Vascular: Denies personal or familial history of DVT/PE. No active claudication or non-healing LE wounds.  Functional Status: Can walk up 2 flights of stairs    Past Medical History:  Shoulder bursitis    Past Surgical History:  No major abdominal operations    Social History:  Functionally independent  No smoking, etoh or drug use  Takes care of her daughter who is in school    Family History:  Mother: Breast Ca, Hypertension  Father: ESRD on HD, CHF, Htn, DM2  Sibling: n/a    Physical Exam:  Vitals:    08/23/24 1017   BP: 118/75   Pulse: 75   Temp: 36.6 °C (97.8 °F)   SpO2: 97%       Gen: A+OX3; NAD  HEENT: PERRL, sclera anicteric, MMM  Cardiac: RRR  Chest: Normal inspiratory effort  Abdomen: S/NT/ND.  Ext: No LE edema  Vascular: well perfused extremitied  Psychiatric: Normal mood, affect    Assessment/Plan:    34 y.o. female presented for evaluation as a potential kidney donor    - The patient is an excellent candidate for kidney donation pending complete evaluation.    - Good functional status    -  Labs  HgbA1c 4.8  Cystatin C 0.6,   24 urine good  UPC 0.05    - CTA Abdomen Pelvis to delineate renal and vascular anatomy    - Potential recipient is 71M with at least 3y on HD, in eval currently    Transplant Education:    I had a long discussion with the patient concerning the risks of kidney donation. I specifically pointed out that the donor gets no benefit from this medical intervention and only incurs risk. We discussed the fact that they should feel comfortable removing themselves from the donation process at any time should they feel uncomfortable with donating.     The confidentiality of the donor will be maintained at all times. I reiterated that I cannot share medical information with the donor about the recipient or vice versa. I reiterated the fact that the recipient may have risk factors for a bad outcome that I cannot necessarily share with the donor. It is a federal crime to receive any compensation monetary or otherwise for donating a kidney. If we find out this is occurring, we will terminate the process.     We discussed the importance of not smoking. We discussed the risks of the surgery which include but are not limited to bleeding, infection, scarring, pain, DVT, PE, kidney failure, organ failure, heart attack, stroke, damage to surrounding structures, obesity, fatigue, nausea/bloating, risk for small bowel obstruction, risk for development of hernias, and death.     We discussed the post-operative course both in the hospital and once they are discharged. We discussed the follow-up schedule which will be 2 weeks post-surgery with me and at 6 months 1 year and 2 years post-surgery with nephrology. We discussed the financial implications of donation including job loss and difficulties obtaining insurances. We discussed the possibility of trauma to the one remaining kidney and should this happen they may going to kidney failure. We discussed the fact that NSAID use should be avoided for  the rest of their life. If they do in fact developed kidney failure, they will require dialysis. They do receive extra points to move up the  donor list should they require a kidney transplant themselves.    Further work-up includes:  Time was given to provide answers to all questions.  The donor expressed understanding and wished to proceed with donation.   The donor will be taken to committee for final decision on appropriateness once the entire evaluation is completed.    Time Attestation:  I spent 60 minutes with the patient, over 50 minutes in counseling and education as outlined above.    Blanca Orellana MD, [facility]  Transplant & Hepatobiliary Surgery

## 2024-08-23 NOTE — PROGRESS NOTES
Spoke to patient via the phone today re donor billing.  She is aware if treatment is needed that would no longer be transplant related.  She is aware she could be denied life insurance if she donates.  Discussed NKR.

## 2024-08-23 NOTE — PROGRESS NOTES
Transplant Nephrology Evaluation of Donor :    Ms Soto is a 34-year-old female who wants to donate to her father who is currently on dialysis was here to get evaluated for being a potential Donor .    History of Present illness: Ms. Soto is a 34-year-old female with no significant past medical history was here to be evaluated to be a potential donor.  -She does have a history of depression with institutionalization twice once in 2013 and 9/11/2019 when she had a bad break-up as well as death of her daughter's father.  She has been stable recently was currently on Prozac.  -She denied any history of hypertension or diabetes or history of kidney stones or use of pain medications.  She does use birth control pills otherwise.  -She does denied any history of abortions or miscarriages or history of clots or blood transfusions.      Past Medical HX: History of bursitis of the shoulders, she had 1 vaginal birth but does have a epidural complication with blood patch placement post childbirth    Family Hx : Father had congestive heart failure hypertension diabetes and end-stage renal disease, mother have breast cancer at the age of 60 years old and hypertension.  She had a 4 brothers and 3 sisters she is currently living with her 10-year-old daughter    Social Hx : She is currently living alone with her 10-year-old daughter who is pretty healthy.  She mentioned that her sister will be the primary caregiver in case if she donated the kidney.  She does a smoke weed occasionally otherwise denied any smoking history denied any drug use or alcohol use.  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     Respiratory: Negative.  Negative for cough, chest tightness and shortness of breath.    Cardiovascular: Negative.  Negative for leg swelling.   Gastrointestinal:  Negative for abdominal distention, blood in stool and constipation.   Endocrine: Negative for polydipsia, polyphagia  "and polyuria.   Genitourinary:  Negative for decreased urine volume, dysuria, flank pain, hematuria and urgency.   Neurological: Negative.    Hematological: Negative.    Psychiatric/Behavioral: Negative.          Objective:  Visit Vitals  /75   Pulse 75   Temp 36.6 °C (97.8 °F) (Temporal)   Ht 1.562 m (5' 1.5\")   Wt 68.3 kg (150 lb 8 oz)   LMP 07/02/2024   SpO2 97%   BMI 27.98 kg/m²   OB Status Having periods   Smoking Status Never   BSA 1.72 m²      Physical Exam  HENT:      Head: Normocephalic.   Eyes:      Pupils: Pupils are equal, round, and reactive to light.   Cardiovascular:      Rate and Rhythm: Normal rate and regular rhythm.   Pulmonary:      Effort: Pulmonary effort is normal. No respiratory distress.      Breath sounds: No wheezing or rales.   Abdominal:      General: There is no distension.      Tenderness: There is no abdominal tenderness. There is no rebound.   Musculoskeletal:         General: Normal range of motion.   Skin:     General: Skin is warm.      Findings: No bruising, lesion or rash.   Neurological:      General: No focal deficit present.   Psychiatric:         Mood and Affect: Mood normal.            Current Outpatient Medications:     albuterol (Ventolin HFA) 90 mcg/actuation inhaler, Inhale 2 puffs every 4 hours if needed for shortness of breath or wheezing., Disp: , Rfl:     clindamycin (Cleocin T) 1 % gel, Apply 1 Application topically 2 times a day., Disp: , Rfl:     clindamycin-benzoyl peroxide gel, Clindamycin Phos-Benzoyl Perox 1.2-2.5 % External Gel APPLY A PEA-SIZED AMOUNT TO THE ENTIRE FACE ONCE DAILY AT BEDTIME. Quantity: 1 Refills: 4 Ordered: 9-Dec-2022 Carlota Daniels DO Start : 9-Dec-2022 Active, Disp: , Rfl:     cyclobenzaprine (Flexeril) 5 mg tablet, Take 1 tablet (5 mg) by mouth 3 times a day as needed for muscle spasms., Disp: 90 tablet, Rfl: 0    drospirenone-ethinyl estradioL (Anamaria, 28,) 3-0.03 mg tablet, Take 1 tablet by mouth once daily., Disp: 84 tablet, Rfl: " 3    fluconazole (Diflucan) 150 mg tablet, Take one tablet today, if symptoms perist after 72 hours take 2nd tablet (Patient not taking: Reported on 8/23/2024), Disp: 2 tablet, Rfl: 0    FLUoxetine (PROzac) 20 mg capsule, Take 1 capsule (20 mg) by mouth once daily. (Patient not taking: Reported on 8/23/2024), Disp: 30 capsule, Rfl: 11    hydrOXYzine HCL (Atarax) 25 mg tablet, Take 1 tablet (25 mg) by mouth once daily at bedtime., Disp: 30 tablet, Rfl: 2    norethindrone-e.estradioL-iron (Eden 24 Fe) 1 mg-20 mcg (24)/75 mg (4) tablet, Take 1 tablet by mouth once daily (Patient not taking: Reported on 8/23/2024), Disp: 84 tablet, Rfl: 2    propranolol (Inderal) 10 mg tablet, Take 1 tablet (10 mg) by mouth 3 times a day as needed., Disp: , Rfl:     Vitamin D3 50 mcg (2,000 unit) capsule, Take 1 capsule (50 mcg) by mouth early in the morning.., Disp: 30 capsule, Rfl: 11     [unfilled]     No images are attached to the encounter.     Assessment and Plan :Brittany Chavez 34 y.o. female who wants to donate to her father who is currently on dialysis was here to get evaluated for being a potential Donor .    Seems to be reasonable candidate for potential donation.  -Urine analysis is bland current GFR is greater than 90, Cystatin C GFR is 125 and 24-hour urine collection is also adequate.  -Her blood pressures today is very good and she is within the normal range of BMI.  -She does have a significant psychiatric history with depression with institutionalization needs psychosocial clearance before proceeding further.  Of note patient also does THC but within normal range.  U tox is positive for marijuana  -Once we get updated imaging we will discuss in the committee about her candidacy further        I had a long discussion with the patient concerning the risks of kidney donation. I specifically pointed out that the donor gets no benefit from this medical intervention and only incurs risk. We discussed the  fact that they should feel comfortable removing themselves from the donation process at any time should they feel uncomfortable with donating.     The confidentiality of the donor will be maintained at all times. I reiterated that I cannot share medical information with the donor about the recipient or vice versa. I reiterated the fact that the recipient may have risk factors for a bad outcome that I cannot necessarily share with the donor. It is a federal crime to receive any compensation monetary or otherwise for donating a kidney. If we find out this is occurring, we will terminate the process.     We discussed the importance of not smoking. We discussed the risks of the surgery which include but are not limited to bleeding, infection, scarring, pain, DVT, PE, kidney failure, organ failure, heart attack, stroke, damage to surrounding structures, obesity, fatigue, nausea/bloating, risk for small bowel obstruction, risk for development of hernias, and death.     We discussed the post-operative course both in the hospital and once they are discharged. We discussed the follow-up schedule which will be 2 weeks post-surgery with me and at 6 months 1 year and 2 years post-surgery with nephrology. We discussed the financial implications of donation including job loss and difficulties obtaining insurance. We discussed the possibility of trauma to the one remaining kidney and should this happen they may go to kidney failure. We discussed the fact that NSAID use should be avoided for the rest of their life. If they do in fact develop kidney failure, they will require dialysis. They do receive extra points to move up the  donor list should they require a kidney transplant themselves.    Finally, we discussed the implications of pregnancy after donation. We discussed the difficulties with possibly getting pregnant as well as the increased risk of preeclampsia and miscarriage. I encouraged her to seek  from her  OB/GYN regarding this matter.    Further work-up includes:  Time was given to provide answers to all questions.  The donor expressed understanding and wished to proceed with donation.   The donor will be taken to committee for final decision on appropriateness once the entire evaluation is completed.      Juanito Woodward MD    Notes created by Dena -Please excuse the Typos .

## 2024-08-23 NOTE — PROGRESS NOTES
Living donor advocate report:   I spoke with Ms. Chavez this afternoon regarding her decision to donate a kidney to her father. We discussed the following topics:     a) evaluation and informed consent process  b) knowledge of the surgical procedure, major risks and benefits (immediate and long-term; medical and psycho-social)  c) evidence of ability to evaluate risks and benefits to both the recipient and herself  d) reasons for choosing to donate  e) voluntary nature of the donation and absence of coercion   f) follow up requirements, including benefit and need     She was also given the following information:   a) the confidential nature of the interview   b) the commitment of the donor advocate to the rights, interests, and well being of the potential donor  c) opportunities for deciding not to donate and assurance of confidentiality if she wishes to withdraw consent  d) access to the advocate team following the interview.     It is my impression that Ms. Chavez is adequately informed; her decision is voluntary.

## 2024-08-23 NOTE — PROGRESS NOTES
"Encounter    Visit Type Initial Visit Location: [place] [address]    Barriers to Communication / Understanding:   [] Language [] Vision [] Hearing [] Other     []  Present     Accompanied By: Alone    Organ For Donation: Kidney    Relationship to Recipient: Genetic  Father, Josh Moreira    Identification Process    Describe Relationship with Recipient   Pt reported the relationship is \"great.\"    How was donor identified   Pt reported she has been trying to start the process since her father started dialysis 3 years ago.     No Is the recipient aware of your offer    No To your knowledge are there other potential donors    [x] Donor is aware they can change their mind at any time   [x] Donor is aware the reason for the change of mind will be kept confidential   [x] Organ donation is of donor's own free will   [x] Their decision is free of inducement, coercion or other external pressures   [x] Donor is not receiving anything in exchange for their organ     Primary motivation to be a donor   Pt reported her primary motivation to be a donor is \"I want my dad to live longer and off of dialysis.\"    Has patient been persuaded or dissuaded in any way   No    How will donation impact your relationship   Pt reported the relationship is \"still gonna be the same.\"    Prior altruistic behaviors  [] None [] Other     [x] Registered Organ Donor on License [] Blood Donor [] Bone Marrow Donor   \"I think so\"    Donors process for making important decisions   Pt reported she makes decisions by \"just doing it and praying on it.\"       Patient's thought process seems to be    [x] Adequate [] Idealistic [] Grandiose [] Other     Patient's insight presents as    [x] Adequate [] Denial [] Absent [] Other     Other Comments:    Health Status of Donor    PCP Denied    Date of Last Physical This week  How Often Every 6 months  Current Health Shoulder problems, abnormal periods  Current Meds / Supplement Use Birth " "control  Past Surgical Experience IUD removal    ** Assessment of increased risk for CDC high disease transmission  [x] Completed/  Reviewed      Knowledge of Recipients Health  Good    Cause of recipients end stage organ disease   HTN and diabetes    Knowledge of alternative treatments for recipient     Are they on dialysis Yes    Patient believes the recipient is compliant with their health and will be able to care for the donated organ Yes    Knowledge of Transplant / Donation:  [x] Patient is able to make an informed decision     [x] Patient has received education regarding medical, psychosocial and financial risks related to living donation        Patient Understand Risks of Donation  Yes Adverse findings   Yes Infection   Yes Complications   Yes Pain, discomfort, and bloating  Yes Nerve injury  Yes Bleeding  Yes Potential scar   Yes Alteration in kidney functioning     Yes Fatigue  Yes Death       Yes Potential recipient complications were discussed with patient including:  Possibility of delayed kidney function  Dialysis after transplant (< 4%)  Failure of kidney after transplant in first year (5%)  Infection  Return of original disease  Death    Patient has an awareness of the two possible procedures laparoscopic or open nephrectomy Yes      Patient Understands Recovery and Follow Up From Donation  Yes length of stay Yes appointments    Education:   TOMAS OLSEN education: HS Diploma    Yes Literate  Yes Computer literate   Yes Internet access         ESL  IEP   Learning Disability    Developmental Disability    Sources of Income   Reduced time,  for [employer] hub    Will patient be in a paid status during recovery   \"Unsure\" - Pt reported she will experience financial concerns after donation if she does not have access to paid status.     Does patient have financial concerns   No    Does the patient have health insurance   Yes - [employer]    Patient Understands Financial Risks of Donation    Yes Personal " "expenses  Yes Expense of travel / lodging  Yes Expense of childcare  Yes Lost wages may not be reimbursed  Yes Need for lifelong follow-up at donor's expense  Yes Future health problems experienced by the donor following donation may not be covered by the recipient's health insurance  Yes Negative impact on ability to obtain or maintain affordable life, health and disability insurance    Relationship Status / Family Dynamic:  Single     How long   Describe Relationship    How long   Describe Relationship    When     When  In a Relationship Yes  How Long 4 years  Describe Relationship \"Fun\"    Spouse / SO Name Lucian  Age 54  Health  \"Stable\"  Spouses Education Level SW LD education : HS Diploma    Other Caregiver Responsibilities    Children:  Yes # Biological (1 daughter)   # Adopted    # Step Children         Child #1 Name Mayra  Age  10  Health \"Perfect\"    Lives With patient  How Much Contact Daily  Comment     Child #2 Name   Age    Health     Lives   How Much Contact     Parents:   Raised By Both Biological Parents    Did the patient have contact with the other parent     Mother  No Age   Cause of Death   Father  No Age   Cause of Death    Living Parent #1 Radha, lives local, daily contact  Living Parent #2 Josh, lives local, daily contact    Siblings:  7 # Biological/Half (3 brothers, 3 sisters)  # Half Siblings  # Step Siblings      Sibling #1  Sibling #2      Support & Recovery Plan:   Yes Adequate    Primary Support:  Name Lucian Phone [phone]  Age 54      Relationship to Patient Significant other  If employed, can they take time off work \"Unsure\"   If so, is it paid time off    If not, will this impact your finances    Did they attend education classes No   Do they have other caregiver responsibilities (child or eldercare) No   Do they have their own conditions which may prevent them from providing care for you No - Pt reported her significant " "other has PTSD but is on medication to help manage symptoms.   (Medical, psychological, physical limitations)    Are they available on short notice Yes   Are they reliable Yes   Are they responsible Yes   Are they able to understand and process new information Yes   Do they have reliable transportation or will you allow them to use your vehicle Yes   Are they currently involved in your care Yes   Comments    Secondary Support   Name Radha Phone [phone]  Age 40  Relationship to Patient Sister  If employed, can they take time off work No - Works at night  If so, is it paid time off    If not, will this impact your finances    Did they attend education classes No   Do they have other caregiver responsibilities (child or eldercare) Yes   Pt reported her sister's significant other can watch the children.   Do they have their own conditions which may prevent them from providing care for you No  (Medical, psychological, physical limitations)    Are they available on short notice Yes   Are they reliable Yes   Are they responsible Yes   Are they able to understand and process new information Yes   Do they have reliable transportation or will you allow them to use your vehicle Yes   Are they currently involved in your care Yes   Comments    Alternate Support   Alternate Support     Housing:  Yes Adequate Rents home  Type of Home Apartment  Who Lives with Patient: Daughter  Distance to [facility] 30 minutes  Pets 2 cats    Transportation:  Yes Adequate  # Licensed Drivers in the Home 1  Does Patient Drive Yes  If not, why   # Reliable Vehicles 1  Does Patient use Public Transportation No  Does Patient use Medical Transportation No  Comments       Mental Health    Cognition:  No impairment observed / reported    The patient reports their mood as \"great.\"    Reported Mental Health Diagnosis   Depression and anxiety  Family History of Mental Health Diagnosis   Depression and anxiety  What are patient's current stressors   Pt " "denied any current stressors.     Current Medications:  No  Mental Health Meds  Denied      Rx'd by   Sleep Meds  Denied      Rx'd by   Pain Meds  Muscle relaxer due to recent injury  Rx'd by     OTC Meds   Denied  Past Medications   Celexa, Prozac, Zoloft    Counseling Previously  Pt reported she engaged in counseling around [id number] after her daughter's father was killed. Pt shared she engaged in counseling twice a week for 1 year. Pt reported she found counseling helpful. Pt shared she tried 2 other times but did not feel the therapists were a good fit. Pt declined MH resources at this time.     IOP  Has patient ever been hospitalized for mental health reasons Yes   Was the hospitalization voluntary No  Duration 3 days   Where [facility]     When 2013  Describe situation  Pt reported she was pink slipped through the ED due to having a lot of anxiety and feeling like she couldn't breathe. Pt shared she visited the ED many times to hear her daughter's heartbeat in fear she would lose the baby after the father was killed.     Has patient ever been hospitalized for mental health reasons Yes   Was the hospitalization voluntary No  Duration 5 days   Where [facility]   When 2019  Describe situation  Pt reported she was going through a rough patch and \"everything was falling apart.\" Pt shared she was having relationship problems as well.     Discharge Plan for Follow Up  Was Discharge Plan Completed     Referral to Transplant Psych Yes  No Is patient under 25 years old  No Is patient a non-directed donor  No Nutcracker syndrome      Suicide Assessment:    History of Suicide Ideation No   Timeframe    Frequency   Plan Created  Intent to Follow Through  Outcome      History of Suicide Attempt No       History of Suicidal Ideation in the past 3 months No   Intensity   Duration     Description of Plan      Plans thought of  Intent to Follow Through  Highest Level of Intent to Follow Through    Current Plan " for Safety    Plan for Follow-Up      In the past 6 months, has anyone physically, sexually, or emotionally abused you?   No     Ever in the past, has anyone physically, sexually, or emotionally abused you?   No    Patient's Reported Trauma History   Yes    Does Patient Feel Safe in Home   Yes        What are patient's coping behaviors   Pt reported doing hair and spending time with her daughter as coping behaviors.     Anabaptism / Spirituality   Spiritual    Attitude toward interviewer Cooperative and Appropriate    Eye Contact Patient maintained good eye contact throughout appointment    Appearance The patient was neatly groomed, appropriately dressed and adequately nourished    Affect Appropriate    Thought Process Appropriate    Is the patient's mental health stable enough to proceed with living donation process and donation   Defer to transplant psych    Does the patient understand the psychological risks of living donation?  Yes Feelings of regret, resentment or anger   Yes Donated organ may not function in recipient  Yes Changes in body image   Yes Potential for depression, anxiety, emotional distress or grief  Yes You and / or the recipient may have complications from the surgery      Yes Does the patient understand the psychological risks to the recipient?  Potential psychological risks for the recipients include possible changes in body image, depression, or anxiety after the surgery or a change in lifestyle.   Emotional distress or grief may occur and this could be related to any of the following possible situations: delayed kidney function, rejection, or failure of the transplanted kidney in the recipient.   In some cases (2-4%), the recipient needs some dialysis after the transplant until the kidney works well.   The chance of the donated kidney failing in the first year after the transplant is about 5%. These situations may or may not require another transplant for the recipient.   There is a  "possibility of the recipient having their original disease return in the new kidney.   There is also a possibility of recipient death during or after the surgery.      Substance Use /Abuse History:    Current Tobacco User No  Patient uses   Tobacco Frequency   For How Long  Is Patient Required to Quit     Former Tobacco User Yes  Describe past tobacco use and date quit  Pt reported she stopped smoking Black and Milds in 2022. Pt shared she smoked since age 18 and would smoke 2 a day.     Current Alcohol User No  Type of Alcohol Used   Amount  Frequency   Pattern of Alcohol Use    Is Patient Required to Quit   Continued to use the substance despite being told the substance is affecting their health    History of problems at work, school or home due to substance use      Former Alcohol User Yes  Describe past alcohol use and date quit  Pt reported she would have 2 drinks in a sitting monthly in the past. Pt shared she drank alcohol heavier around age 22.      Has patient ever gone to CD treatment   If yes, When, Where and What type of Program  Attends AA meetings    Sponsor  Do support people drink alcohol   If yes, describe support people's use  Is alcohol kept in the home   Does Patient need to sign a CD contract     Current Illegal / Unprescribed Drug User Yes  Type of Illegal Drug Used   Frequency  Pattern of Drug Use  Pt reported she smokes 1-2 joints of marijuana daily. Pt shared marijuana \"helps with everything.\"     Is Patient Required to Quit     Illegal / Unprescribed Drug #2  Type of Illegal Drug Used   Frequency  Pattern of Drug Use      Continue to use the substance despite being told the substance is affecting their health    History of problems at work, school or home due to substance use      Former Illegal / Unprescribed Drug User Yes  Describe past illegal drug use and date quit  Pt denied any heavier marijuana use in the past.     Has patient ever gone to CD treatment   If yes, When, Where and What " type of Program   Attends AA/NA  meetings    Is patient on a Methadone / Suboxone regiment   Do support people use illegal drugs   If yes, describe support people's use  Are illegal drugs kept in the home   Does Patient need to sign a CD contract     Illegal / Unprescribed Drug #2  Type of Illegal Drug Used   Frequency  Pattern of Drug Use    Prescription Drug Abuse:  No Has patient experienced feelings of addiction  No Has patient experienced symptoms of withdrawal  No Has patient experienced any side effects? e.g.  hallucinations or delusions    Does Patient Meet the Criteria for Alcohol Use Disorder No  Diagnosis  Does Patient Meet [facility]OTC guidelines Unsure  Does Patient Meet the Criteria for Illegal Drug Use Disorder No Diagnosis  Does Patient Meet [facility]OTC guidelines No    [facility] Substance Relapse Risk Factors   N/A   Has not been abstinent for more than 3 months (outside a controlled environment).     Has not participated in at least 1 month (3 meetings a week for 4 weeks=12) of an active recovery program (structured treatment program and/or documented 12 step meeting attendance with sponsor selection contact).     Has not participated in at least 3 month (3 meetings a week for 12 weeks=36) of an active recovery program (structured treatment program and/or documented 12 step meeting attendance with sponsor selection contact).     Does not have an adequate sober, stable social network to support the patient's commitment to abstinence both pre and  post- transplant.     Does not have insight into his/her past misuse/abuse.     Persistent desire or unsuccessful efforts to cut down or control use.     Continued to use substance despite being told that the use is affecting his/her health.     Has a psychiatric disorder and does not have adequate coping skills for dealing with stressors.     History of problems at work, school or home due to substance use.     Has had two or more failures with a structured rehabilitation  program.     Has failed random toxicology screens during medical evaluation for transplant.     Meets criteria for multiple substance use disorders.     History of driving under the influence or other legal consequences of substance use.      DSM-5 Severity Factors:   N/A     The substance is often taken in larger amounts or over a longer period than was intended.     There is a persistent desire or unsuccessful effort to cut down or control use of the substance.     A great deal of time is spent in activities necessary to obtain the substance, use the substance, or recover from its effects.     Craving, or strong desire or urge to use the substance.     Recurrent use of the substance resulting in a failure to fulfill major role obligations at work, school, or home.     Continued use of the substance despite having persistent or recurrent social or interpersonal problems caused or exacerbated by the effects of its use.     Important social, occupational, or recreational activities are given up or reduced because of use of the substance.     Recurrent use of the substance in situations in which it is physically hazardous.     Use of the substance is continued despite knowledge of having a persistent or recurrent physical or psychological problem that is likely to have been caused or exacerbated by the substance.     Tolerance, as defined by either of the following:  A need for markedly increased amounts of the substance to achieve intoxication or desired effect.  A markedly diminished effect with continued use of the same amount of the substance.     Withdrawal, as manifested by either of the following:  The characteristic withdrawal symptom for that substance (as specified in the DSM-V for each substance).  The substance (or closely related substance) taken to relieve or avoid withdrawal symptoms.       Legal Issues:  No Arrests  No Currently probation or parole No   shelter No  When   How long   Where    "  Citizenship:  Yes [place] Citizen   Green Card  Visa    Advance Directives: No  HCPOA     SW provided documents.     Guardian:    Impression:  SW met with Pt alone for initial psychosocial assessment. Pt was pleasant and engaged. Pt reported she is hoping to donate to her father. Pt reported her primary motivation to be a donor is \"I want my dad to live longer and off of dialysis.\" Pt reported she is donating of her own free will and understands her information will be kept confidential from the recipient. Pt reported she is not being pressured to donate and is not receiving anything in exchange for donation. Pt expressed understanding that she can change her mind at any time. SW reviewed the [facility] increased risk behaviors with Pt, and Pt denied engaging in any of these behaviors in the past 3 months. Pt reported she currently works reduced time and is unsure if she will have access to paid status. Pt shared she will experience financial concerns after donation if she does not have access to paid status. Pt reported she has [place] for insurance. Pt reported her mood as \"great.\" Pt shared she has been diagnosed with depression and anxiety. Pt reported she has utilized MH medications and counseling in the past to help manage symptoms. Pt shared she has been hospitalized for MH twice. Pt scored a 1 on the PHQ-9, indicating minimal clinical depression. Pt scored a 4 on the JOSEFA-7, indicating minimal clinical anxiety. SW referral to transplant psych. Pt denied any current tobacco or alcohol use. Pt reported she smokes 1-2 joints of marijuana daily. Pt reported she stopped smoking Black and Milds in 2022. Pt shared she smoked since age 18 and would smoke 2 a day. Pt reported she would have 2 drinks in a sitting monthly in the past. Pt shared she drank alcohol heavier around age 22. Pt is moderate-high psychosocial risk due to MH history and possible financial concerns if there is no access to paid status.     Plan:  Pt to meet " with transplant psych. SW to await recommendations from transplant psych. SW to meet with Pt in 6 months to monitor Pt's risk factors. SW to call and confirm primary and secondary supports.

## 2024-08-23 NOTE — PROGRESS NOTES
Brittany was seen in clinic this morning for donor evaluation. Will proceed with routine testing and a visit with transplant psychologist. Pap up to date. Labs reviewed by Dr. Solomon and acceptable. CT scan to be completed today.

## 2024-08-26 LAB
HLA CLS I TYP PNL BLD/T DONR HIGH RES: NORMAL
HLA RESULTS: NORMAL
HLA-DP2 QL: NORMAL
HLA-DQB1 HIGH RES: NORMAL
HLA-DRB1 HIGH RES: NORMAL

## 2024-08-29 ENCOUNTER — LAB REQUISITION (OUTPATIENT)
Dept: LAB | Facility: CLINIC | Age: 34
End: 2024-08-29
Payer: COMMERCIAL

## 2024-08-29 ENCOUNTER — APPOINTMENT (OUTPATIENT)
Dept: PRIMARY CARE | Facility: HOSPITAL | Age: 34
End: 2024-08-29
Payer: COMMERCIAL

## 2024-08-29 DIAGNOSIS — Z00.5 ENCOUNTER FOR EXAMINATION OF POTENTIAL DONOR OF ORGAN AND TISSUE: ICD-10-CM

## 2024-08-29 LAB
ATRIAL RATE: 56 BPM
HLA CLS I TYP PNL BLD/T DONR HIGH RES: NORMAL
HLA RESULTS: NORMAL
HLA-DP2 QL: NORMAL
HLA-DQB1 HIGH RES: NORMAL
HLA-DRB1 HIGH RES: NORMAL
P AXIS: 40 DEGREES
P OFFSET: 198 MS
P ONSET: 160 MS
PR INTERVAL: 132 MS
Q ONSET: 226 MS
QRS COUNT: 10 BEATS
QRS DURATION: 70 MS
QT INTERVAL: 398 MS
QTC CALCULATION(BAZETT): 384 MS
QTC FREDERICIA: 389 MS
R AXIS: 20 DEGREES
T AXIS: 10 DEGREES
T OFFSET: 425 MS
VENTRICULAR RATE: 56 BPM

## 2024-08-29 ASSESSMENT — ANXIETY QUESTIONNAIRES
5. BEING SO RESTLESS THAT IT IS HARD TO SIT STILL: NOT AT ALL
3. WORRYING TOO MUCH ABOUT DIFFERENT THINGS: NOT AT ALL
1. FEELING NERVOUS, ANXIOUS, OR ON EDGE: SEVERAL DAYS
4. TROUBLE RELAXING: NOT AT ALL
6. BECOMING EASILY ANNOYED OR IRRITABLE: MORE THAN HALF THE DAYS
7. FEELING AFRAID AS IF SOMETHING AWFUL MIGHT HAPPEN: NOT AT ALL
2. NOT BEING ABLE TO STOP OR CONTROL WORRYING: SEVERAL DAYS
GAD7 TOTAL SCORE: 4

## 2024-08-29 ASSESSMENT — PATIENT HEALTH QUESTIONNAIRE - PHQ9
6. FEELING BAD ABOUT YOURSELF - OR THAT YOU ARE A FAILURE OR HAVE LET YOURSELF OR YOUR FAMILY DOWN: NOT AT ALL
5. POOR APPETITE OR OVEREATING: NOT AT ALL
9. THOUGHTS THAT YOU WOULD BE BETTER OFF DEAD, OR OF HURTING YOURSELF: NOT AT ALL
2. FEELING DOWN, DEPRESSED OR HOPELESS: NOT AT ALL
1. LITTLE INTEREST OR PLEASURE IN DOING THINGS: NOT AT ALL
SUM OF ALL RESPONSES TO PHQ QUESTIONS 1-9: 1
SUM OF ALL RESPONSES TO PHQ9 QUESTIONS 1 & 2: 0
7. TROUBLE CONCENTRATING ON THINGS, SUCH AS READING THE NEWSPAPER OR WATCHING TELEVISION: NOT AT ALL
3. TROUBLE FALLING OR STAYING ASLEEP OR SLEEPING TOO MUCH: SEVERAL DAYS
8. MOVING OR SPEAKING SO SLOWLY THAT OTHER PEOPLE COULD HAVE NOTICED. OR THE OPPOSITE, BEING SO FIGETY OR RESTLESS THAT YOU HAVE BEEN MOVING AROUND A LOT MORE THAN USUAL: NOT AT ALL
4. FEELING TIRED OR HAVING LITTLE ENERGY: NOT AT ALL

## 2024-09-09 ENCOUNTER — CLINICAL SUPPORT (OUTPATIENT)
Dept: TRANSPLANT | Facility: HOSPITAL | Age: 34
End: 2024-09-09
Payer: COMMERCIAL

## 2024-09-09 DIAGNOSIS — Z00.5 TRANSPLANT DONOR EVALUATION: Primary | ICD-10-CM

## 2024-09-09 PROCEDURE — 90791 PSYCH DIAGNOSTIC EVALUATION: CPT | Performed by: PSYCHOLOGIST

## 2024-09-09 NOTE — PROGRESS NOTES
"BEHAVIORAL HEALTH: PSYCHOLOGICAL ASSESSMENT FOR LIVING DONATION    Patient's Name: Brittany Chavez  :  1990  MRN:  [id number]    Diagnosis: transplant donor evaluation  Intended recipient: father    Date of Service: 2024  Start Time: 1:05 pm   End Time: 1:50 pm  Location: Transplant [place], [facility]     Presenting information: Ms. Chavez was seen for this psychological assessment as one part of a comprehensive evaluation for living kidney donation. She was friendly today and seemed to discuss her situation in an open manner. She spoke with obvious affection of her father, the intended recipient. She indicated that she is highly motivated to donate directly to him but would not want to pursue this if she needed to donate through a swap or on a voucher. Her father is on dialysis. She feels like she was always the person who would do this.    MEDICAL HISTORY  Prior surgeries: not really    Diet/exercise: She doesn't have a workout routine but is always busy, and works a lot, usually six days a week. Normal diet     Attendance at Appointments:   -PCP: not yet   -Mental health: N/A   -PT/Rehabilitation: She has stiffness in her neck and has an appointment to see a PT.    Use of NSAIDS: She has been taking a prescription muscle relaxant but is generally avoidant of taking pills so doesn't tend to use OTC painkillers.    SUBSTANCE USE  Tobacco: none    Alcohol: twice a month    Illicit Substance Use:  marijuana    Substance Disorder Treatment: none reported    PSYCHOLOGICAL HISTORY  Current mood \"I'm pretty steady.\"    Previously Diagnosed Psychological Disorders:   Mood Disorders:   -Depression: Symptoms include feeling \"standoffish\" (e.g., withdrawn, avoidant of social engagement). She thinks she's had symptoms of depression since her teens.  -Anxiety: Symptoms include worrying. Anxiety also has been present since teens but was exacerbated when her daughter's father was " "killed. She shared that she became exceedingly fearful of her daughter being hurt.   Developmental/Behavioral Disorders: none  Cognitive Disorders: none  Personality Disorders: none  Psychotic Disorders: none    Treatment for Psychological Disorders:  Outpatient counseling:  -last counseling effort (location, timeframe): She went a couple of years ago but didn't feel particularly connected to the therapist. She said she had a serious therapy effort in 2013.  Pharmacological management:   -previous psychotropic medications used: Prozac, Zoloft, Celexa--made her feel \"weird\"  -current psychotropic medications: N/A  -prescribing provider: did have a psychiatriist but some meds were prescribed by PCP  Inpatient treatment:   -how many episodes: three times, the last time in 2019  -reason for hospitalization: had a lot of responsibility, was overwhelmed  -length of stay: 5 days for the last one in 2019  -outcome: She felt better after the stay.    Suicidal/Homicidal Tendencies: She used to have those feelings but denied ever engaging in self-injurious behavior. Can't think of a situation in which she would harm herself.    Coping Strategies: listens to music, likes to drive    Coping with surgery/recovery: Her daughter will keep her occupied; she is not really into watching television but thinks she would enjoy the opportunity to get extra rest.    Suicide Risk Assessment:  Risk factors: severe anxiety (though not in the last five years)  Protective factors: marriage/partnership, children, expressed desire to live    Mental Status Exam:  General Appearance: well groomed, appropriate eye contact  Attitude/Behavior: cooperative  Motor: no psychomotor agitation or retardation, no tremor or other abnormal movements  Speech: normal rate, volume, prosody  Gait/Station: WFL  Mood: euthymic   Affect: euthymic, full-range  Thought Process: linear, goal directed  Thought Associations: no loosening of associations  Thought Content: " "normal  Perception: no perceptual abnormalities noted  Sensorium: alert and oriented to person, place, time and situation  Insight: intact  Judgment: intact  Cognition: cognitively intact to conversational testing with respect to attention, orientation, fund of knowledge, recent and remote memory, and language    SOCIAL HISTORY  Siblings: none have the same mother and father: 4 brothers, 4 sisters  Parents: mother is still living, father is living--are  to each other (though they weren't when she was born)  Relationship Status: not , is in a relationship  Children: daughter aged 10 (her father has )  Education: went to the 12th grade but didn't graduate  Occupation: works at [employer]  Employment Status: supposedly 32 hours but is basically working 40 hours a week (she is supposed to work five days a week but has been frequently working six days)  Current living situation: She lives only with her daughter.    DONOR-SPECIFIC ISSUES  -Advised supervisors of potential absence from work? supportive  -Type of leave available: This appears uncertain at this time.  -Financial backups in place in the event of loss of income: not sure    -Degree of support from family, friends, trusted others: They have concerns, some misunderstandings about what donation involves.  -Personal motivation for attempting donation: \"I hate that he has to go to dialysis.\" Parents like to travel.  -Other types of charitable giving: none    -On a scale from 0 (no desire to donate) to 10 (absolute desire to donate), rate yourself regarding your readiness and willingness to donate: 8  -How would you emotionally react to learning that the kidney you donated did not help the recipient: \"I'd probably be okay.\"  -What regrets do you anticipate having after donation? none  -How do you expect your relationship with the recipient to change following donation? No changes anticipated  -Do you have concerns about the recipient's ability to care " for the transplanted kidney? none    FOR NON-DIRECTED DONORS AND DONORS INVOLVED IN PAIRED-EXCHANGE/NKR  She is not interested in donating to anyone but her father. If they are not a match, she said she would like to close the evaluation.    IMPRESSIONS  Overall health and commitment to aftercare: Ms. Chavez understands the need to stay active and consume a healthy diet long-term. She indicated she does not currently have a PCP so she will need to get that set up.    Substance use issues: not a concern    Psychological issues: Ms. Chavez has a history of sometimes severe depression/anxiety that has resulted in three hospitalizations. It seems these were related to episodes of overwhelm and fear in the aftermath of her partner's death. She seems to have developed a more secure living situation in recent years that has provided her a greater sense of safety and reduced her psychological distress. She is not currently involved in mental health counseling or on antidepressant medication and reports feeling stable. It has been five years since her last hospitalization.    Social support:  I will defer to the opinion of the . I did identify some concerns about finances and was not clear who would be caring for her daughter in her absence.    Coercion: I detected no evidence of overt coercion in the decision to come forward as a living donor. Further, this potential donor did confirm understanding of the ability to end this evaluation and/or stop the process at any point leading up to the actual surgery for any reason.    Informed consent: This potential donor expressed a reasonable understanding of risks of the surgery and a reasonable understanding of the postoperative course and long-term expectations for self-care.    RECOMMENDATIONS  I identified no absolute psychological contraindications for living donation. I will discuss the above-noted issues with the living donor team during the case  presentation.    Claribel Yip, PhD  Clinical Psychologist, Transplant [place]  Clinical Professor, Department of Psychiatry  [facility]      Note to patient: The 21st Century Cares Act makes medical notes like these available to patients in the interest of transparency; however, be advised this is a medical document. It is intended as communication with other medical professionals, not as a form of communication from the practitioner to the patient. It is written in medical language and may contain abbreviations or verbiage that are unfamiliar. It may appear blunt or direct. Medical documents are intended to carry relevant information, facts as evident, and the clinical opinion of the practitioner.

## 2024-09-13 ENCOUNTER — DOCUMENTATION (OUTPATIENT)
Dept: TRANSPLANT | Facility: HOSPITAL | Age: 34
End: 2024-09-13
Payer: COMMERCIAL

## 2024-09-13 DIAGNOSIS — Z52.4 DONOR OF KIDNEY FOR TRANSPLANT: Primary | ICD-10-CM

## 2024-09-13 NOTE — PROGRESS NOTES
Spoke with Brittany and let her know that we would like her to get a 2h OGTT. She expressed understanding and will get this done either this coming week or the next. Reviewed instructions and she stated that she has done this before.

## 2024-09-16 ENCOUNTER — EVALUATION (OUTPATIENT)
Dept: PHYSICAL THERAPY | Facility: CLINIC | Age: 34
End: 2024-09-16
Payer: COMMERCIAL

## 2024-09-16 ENCOUNTER — APPOINTMENT (OUTPATIENT)
Dept: PRIMARY CARE | Facility: HOSPITAL | Age: 34
End: 2024-09-16
Payer: COMMERCIAL

## 2024-09-16 DIAGNOSIS — S16.1XXD STRAIN OF NECK MUSCLE, SUBSEQUENT ENCOUNTER: Primary | ICD-10-CM

## 2024-09-16 DIAGNOSIS — S16.1XXA CERVICAL STRAIN, ACUTE, INITIAL ENCOUNTER: ICD-10-CM

## 2024-09-16 PROCEDURE — 97161 PT EVAL LOW COMPLEX 20 MIN: CPT | Mod: GP

## 2024-09-16 PROCEDURE — 97110 THERAPEUTIC EXERCISES: CPT | Mod: GP

## 2024-09-16 NOTE — PROGRESS NOTES
Physical Therapy Evaluation    Patient Name Brittany Chavez  MRN: [id number]  Today's Date: 9/16/2024  Time Calculation  Start Time: 0812  Stop Time: 0848  Time Calculation (min): 36 min      Assessment:  The pt presents with neck pain and stiffness affecting tolerance for normal activities.  The pt has limited ROM, flexibility, strength and postural/ body mechanics awareness. The pt is an excellent candidate for skilled therapy to address the above listed limitations and to improve tolerance for functional activities including work duties.     Impression:  Skilled PT services will aid in advancing functional status and attaining therapy goals.    Problem List:  -activity limitations  -Functional limitations  -Pain c-spine  -decreased  ROM  -decreased strength   -decreased flexibility  -posture awareness/ body mechanics  -decreased knowledge of HEP    Goals:  STG: Pt (I) with initial HEP; By week 3; Goal     LTG Goals: 9/16/24  By week: 4-8     Pain: Decrease neck pain to 1/10 or < with functional activities ; Goal     Posture: pt to demonstrate postural and body mechanics awareness ; Goal     ROM: Increase c-spine  ROM to WNL without limitation from pain. ;  Goal     Strength: Increase scapular and UE strength to 5/5 grossly for improved tolerance for functional activities. ; Goal     Flexibility: Improve flexibility of c-spine/UE to WFL ; Goal     Palpation: Decrease pain with palpation by 50% or > ; Goal    HEP: Pt to be (I) with HEP  ; Goal     Outcome Measurement: NDI = 5/50 or < ; Goal     Rehab Potential: excellent    Plan:  1x/wk (Mondays) x 4 visits, last visit re-eval     Evaluation, Re-evaluation, Therapeutic Exercise, Therapeutic Activity; Neuromuscular reeducation; Postural Education; Body Mechanics; Home Exercise Program; Manual Techniques; Cold Pack; E-Stim; Ultrasound; IMDN PRN    1 x week  until goals met or maximum rehab potential met  Pt is currently scheduled for 4 weeks    Plan of care was  designed with input and agreement by the patient        Therapy Diagnoses:   Problem List Items Addressed This Visit             ICD-10-CM    Cervical strain - Primary S16.1XXA    Relevant Orders    Follow Up In Physical Therapy     Other Visit Diagnoses         Codes    Cervical strain, acute, initial encounter     S16.1XXA            General:  Reason for visit: c-spine pain  Referred by: Dr Leena Berman MD appt:  unknown  Preferred Name:  Brittany  Script:  eval and treat  Onset Date:  6/1/24  Email: [email or web address]    Insurance:  - [employer] [facility] , 30 V PCY 0 used 100%  -authorization required: no    Subjective:    Current Episode of Functional Impairment and/or Pain : The pt reports she fell asleep in the car in June and she woke with pain in the c-spine.  It has been painful since. The pt reports the pain is in the c-spine region mainly, denies numbness or tingling/ pain in UE.        Pain       Pain assessment 0-10  Pain score: c-spine 4    Exacerbating Factors: rotation, ext/flexion, lifting at work, sleeping, yawning  Relieving Factors: muscle relaxer,    Functional Limitations:  Functional Limitations: Reaching, Lifting, Sleeping, and Working    Home Living Situation: n/a    Prior Level of Function: (I)    Patient Goal For Therapy: To decrease pain and tolerate normal activities.    Occupation: some lifting at work, does  and returns    Hobbies: has a 10 year old, walking    Precautions:     Current Medical management:     PMHx: asthma, bursitis B shoulders     Medications for pain: muscle relaxer     Diagnostic Tests: x-ray  Fall risk: none    Objective:    Pain:            Cervical 4/10 , worst in morning    Posture:           Min forward head, rounded shoulders decreased postural/ body mechanics awareness    ROM:           AROM           Cervical rotation R 42 , L 42 pain B                        SB R 13 , L 22 pain B                        Extension 11 pain B                 "        Flexion 33 pain B            Strength:           Cervical   flexion unable due to pain                           Extension unable due to pain                           SB R unable due to pain , L unable due to pain           Shoulder flexion R/L 4/4 min pain                          abd R/L n/a                          add R/L n/a                          extension R/L 4/4 min pain                          IR R/L 4+/4+                          ER R/L 4/4                          biceps R/L 4+/4+                          triceps R/L 4+/4+    Flexibility:           Upper trap R/L max/ max restriction pain!          Pec R/L min / min          Levator Scapula R/L max / max Pain!          Scalenes R/L max / max pain!      Special tests:            Quadrant: unable to go to end range because of pain          Compression n/a          Distraction min pain          Lateral glides min decreased mobility, pain    Palpation:           Very tender with palpation of the B upper traps, levator scap, c-spine musculature       Ortho:  Outcome Measures:  Other Measures  Disability of Arm Shoulder Hand (DASH): 12/50     Treatment:    Evaluation:  Evaluation Complexity: Low: 26 minutes; Moderate   minutes; Complex PT Evaluation Time Entry: 26;  minutes    **= HEP  NV= Next visit  np= not performed  nb= non-billable  G= group HEP= discharged to HEP    Therapeutic Exercise: 10  Supine chin tucks: 5\" x 5 **  Scapular retraction: 5\" x 10 **  AROM c-spine rotation: x 5 each**     NMR:         Education: Pt educated on dx, POC, anatomy, posture, ther ex technique and HEP  Preferred learning:  pictures, demonstration.  Demonstrated good understanding.      Access Code: [email or web address]  URL: [email or web address]  Date: 09/16/2024  Prepared by: Bainka Geiger    Exercises  - Standing Scapular Retraction  - 3-5 x daily - 7 x weekly - 5-10 reps - 5 hold  - Standing Cervical Retraction  - 2-3 x daily - 7 x weekly - 5-10 " reps - 2 hold  - Supine Cervical Retraction with Towel  - 1-2 x daily - 1 sets - 5-10 reps - 2 hold  - Standing Cervical Rotation AROM  - 2-3 x daily - 7 x weekly - 5-10 reps

## 2024-09-23 ENCOUNTER — LAB (OUTPATIENT)
Dept: LAB | Facility: LAB | Age: 34
End: 2024-09-23
Payer: COMMERCIAL

## 2024-09-23 DIAGNOSIS — Z52.4 DONOR OF KIDNEY FOR TRANSPLANT: ICD-10-CM

## 2024-09-23 LAB
GLUCOSE 2H P 75 G GLC PO SERPL-MCNC: 110 MG/DL
GLUCOSE P FAST SERPL-MCNC: 91 MG/DL

## 2024-09-23 PROCEDURE — 82950 GLUCOSE TEST: CPT

## 2024-09-23 PROCEDURE — 82947 ASSAY GLUCOSE BLOOD QUANT: CPT

## 2024-09-30 ENCOUNTER — TREATMENT (OUTPATIENT)
Dept: PHYSICAL THERAPY | Facility: CLINIC | Age: 34
End: 2024-09-30
Payer: COMMERCIAL

## 2024-09-30 DIAGNOSIS — S16.1XXD STRAIN OF NECK MUSCLE, SUBSEQUENT ENCOUNTER: ICD-10-CM

## 2024-09-30 PROCEDURE — 97112 NEUROMUSCULAR REEDUCATION: CPT | Mod: GP

## 2024-09-30 PROCEDURE — 97110 THERAPEUTIC EXERCISES: CPT | Mod: GP

## 2024-09-30 PROCEDURE — 97140 MANUAL THERAPY 1/> REGIONS: CPT | Mod: GP

## 2024-09-30 NOTE — PROGRESS NOTES
"Patient Name Brittany Chavez   MRN: [id number]  Today's Date: 9/30/2024  Time Calculation  Start Time: 0938  Stop Time: 1023  Time Calculation (min): 45 min    Insurance:    (per information provided by  pre-cert team)  - [employer] [facility] , 30 V PCY 0 used 100%  -authorization required: no  Visit #: 2     Therapy Diagnoses:   1. Strain of neck muscle, subsequent encounter  Follow Up In Physical Therapy          General:  Reason for visit: c-spine pain  Referred by: Dr Leena Berman MD appt:  unknown  Preferred Name:  Brittany  Script:  eval and treat  Onset Date:  6/1/24  Email: [email or web address]  Re-eval: n/a    Assessment:  Patient tolerated treatment well, did well with progression this date.  She had some soreness following the shoulder extension stretch.  This was reduced with the seated and supine manual techniques.  The pt reported understanding of the HEP instructions.   Patient needs continued work on/skilled PT for: pain reduction, mobility and functional/ postural strength to address remaining functional, objective and subjective deficits to allow them to return to prior /optimal level of function with ADLs.  Patient is progressing with goals: pain reduction, ROM, flexibility, strength, HEP  Skilled care:  therapeutic exercises, NMR, manual techniques, HEP/ pt education    Plan:    Continue to progress per poc:   NV add door pec stretch    Subjective:   Patient reports:  her cousin \"cracked\" her neck a week or so ago and she had decreased pain and improved mobility following that.  She has been compliant with the HEP and has generally been tolerating that well.     Have you fallen since last visit:  no    Precautions: no fall risk   PMHx: asthma, bursitis B shoulders     Medications for pain: muscle relaxer     Diagnostic Tests: x-ray    Pain:  no number given/10  Location/Type of pain:  B upper trap region    HEP compliance/understanding:  yes    Objective:   Objective Measurements: " "n/a    Treatment:   **= HEP  NV= Next visit  np= not performed  nb= non-billable  G= group HEP= discharged to HEP      Therapeutic Exercise:     17 minutes  Scapular retraction: 5\" x 10 **  Chin tucks: 5\" x 10 **  Seated gentle upper trap stretch: 10\" x 3 each **  Seated gentle levator scap stretch: 10\" x 3 each **  Standing cane extension: 5\" x 10 **    Manual Therapy:     20 minutes  Seated B upper trap region DTM/ TPR  Supine suboccipital release, manual traction, lateral glides and TPR c-spine muscles    Neuromuscular Re-education:    8 minutes  T -band rows: yellow 10 x 1 **  T-band B ER/ scapular retraction: yellow 10 x 1 **      Modalities:       Vasopneumatic Device       minutes  Electrical Stimulation          minutes  Ultrasound            minutes  Iontophoresis                     minutes  Cold Pack            minutes  Mechanical Traction           minutes    Self Care Home Management:    minutes      Education:    Pt ed on therapeutic exercise technique, posture, HEP  HEP Progression:    Access Code: [email or web address]  URL: [email or web address]  Date: 09/30/2024  Prepared by: Bianka Geiger    Exercises  - Seated Gentle Upper Trapezius Stretch  - 1-2 x daily - 7 x weekly - 2-3 sets - 20 hold  - Gentle Levator Scapulae Stretch  - 1-2 x daily - 7 x weekly - 2-3 sets - 20 hold  - Standing Shoulder Extension with Dowel  - 1-2 x daily - 7 x weekly - 1-2 sets - 5-10 reps - 5 hold  - Standing Shoulder Row with Anchored Resistance  - 1 x daily - 7 x weekly - 1-3 sets - 10 reps  - Shoulder External Rotation and Scapular Retraction with Resistance  - 1 x daily - 7 x weekly - 1-2 sets - 10 reps - 3-5 hold     "

## 2024-10-01 ENCOUNTER — COMMITTEE REVIEW (OUTPATIENT)
Dept: TRANSPLANT | Facility: HOSPITAL | Age: 34
End: 2024-10-01
Payer: COMMERCIAL

## 2024-10-01 ENCOUNTER — DOCUMENTATION (OUTPATIENT)
Dept: TRANSPLANT | Facility: HOSPITAL | Age: 34
End: 2024-10-01
Payer: COMMERCIAL

## 2024-10-01 NOTE — PROGRESS NOTES
Pharmacist Pre-Transplant Donor Screening Note     Current Outpatient Medications on File Prior to Visit   Medication Sig Dispense Refill    albuterol (Ventolin HFA) 90 mcg/actuation inhaler Inhale 2 puffs every 4 hours if needed for shortness of breath or wheezing.      clindamycin (Cleocin T) 1 % gel Apply 1 Application topically 2 times a day.      clindamycin-benzoyl peroxide gel Clindamycin Phos-Benzoyl Perox 1.2-2.5 % External Gel APPLY A PEA-SIZED AMOUNT TO THE ENTIRE FACE ONCE DAILY AT BEDTIME. Quantity: 1 Refills: 4 Ordered: 9-Dec-2022 Carlota Daniels DO Start : 9-Dec-2022 Active      drospirenone-ethinyl estradioL (Anamaria, 28,) 3-0.03 mg tablet Take 1 tablet by mouth once daily. 84 tablet 3    fluconazole (Diflucan) 150 mg tablet Take one tablet today, if symptoms perist after 72 hours take 2nd tablet (Patient not taking: Reported on 8/23/2024) 2 tablet 0    FLUoxetine (PROzac) 20 mg capsule Take 1 capsule (20 mg) by mouth once daily. (Patient not taking: Reported on 8/23/2024) 30 capsule 11    hydrOXYzine HCL (Atarax) 25 mg tablet Take 1 tablet (25 mg) by mouth once daily at bedtime. 30 tablet 2    norethindrone-e.estradioL-iron ([place] 24 Fe) 1 mg-20 mcg (24)/75 mg (4) tablet Take 1 tablet by mouth once daily (Patient not taking: Reported on 8/23/2024) 84 tablet 2    propranolol (Inderal) 10 mg tablet Take 1 tablet (10 mg) by mouth 3 times a day as needed.      Vitamin D3 50 mcg (2,000 unit) capsule Take 1 capsule (50 mcg) by mouth early in the morning.. 30 capsule 11     No current facility-administered medications on file prior to visit.       The patient's reported medications have been reviewed. Based on the above medication list, there are no pharmacologic contraindications to living kidney donation.    Of note, it is documented that she is taking estrogen containing contraception, which would need to be held for at least 2 weeks prior to donation.     It is listed that she takes the following:  fluoxetine (last fill 3/30) and hydroxyzine (last fill 1/11). Based on the psychologist note, it does not seem like she is taking these.    Reba Bianchi, PharmD, BCTXP  Clinical Pharmacy Specialist - Solid Organ Transplant

## 2024-10-01 NOTE — COMMITTEE REVIEW
Presentation for Donation     Evaluation Date: 7/29/2024   Committee Review Date: 10/1/2024    Organ: Kidney    Transplant Phase: Evaluation  Transplant Status: Active    Transplant Coordinator: Supriya Mccormack  Transplant Surgeon:      PCP: GENERIC EXTERNAL DATA PROVIDER    Committee Review Members:  Financial Counseling and Assistance Services Amberly Sinha MT, Cornelio Monzon MD PhD   Pharmacy Reba Bianchi, PharmD   Psychology Claribel Yip, PhD    [facility] BETTS [facility]   Transplant Humberto Melgar MD, Leena Villasenor, Crissy Sloomon MD, OSCAR KRAMER, NANETTE, Supriya Mccormack, NANETTE, Anila Olson, RN, Tom Beard, PhD, Therese Alexis MD   Transplant Surgery Blanca Orellana MD       Suitability: None    Relative Contraindications: None    Absolute Contraindications: None    Committee Review Decision: Declined     Committee Discussion Details: Candidacy discussion at committee. Not a candidate for donation due to psychosocial concerns that could put her at risk after donation.

## 2024-10-01 NOTE — LETTER
October 1, 2024    Brittany YURI Chavez  [address]  [facility] [address]      Dear Ms. Chavez:    Our multi-disciplinary transplant team completed a review of your medical records on 10/1/2024.  I regret to inform you that the decision was not to proceed with placing you on the [facility] ([facility]) waiting list for a Kidney transplant.    Our transplant program consists of surgeons and medical doctors who provide coverage 365 days a year, 24 hours a day.     If you have any questions or concerns regarding your insurance coverage or billing issues, a  is available to speak with you.     It is important to keep us updated of any major changes in your medical condition, contact information and health insurance coverage.     Please don't hesitate to contact us at Dept: [phone] with any questions or concerns. We look forward to working with you through this process.      Sincerely,      Supriya Mccormack RN          The UNOS Toll-free Patient Services Line:  Your Resource for Organ Transplant Information    If you have a question regarding your own medical care, you always should call your transplant hospital first. However, for general organ transplant-related information, you should call the [facility] (UNOS) toll-free patient services line at [phone].  Anyone, including potential transplant candidates, candidates, recipients, family members, friends, living donors, and donor family members, can call this number to:    Talk about organ donation, living donation, the transplant process, the donation process, and transplant policies.  Get a free patient information kit with helpful booklets, waiting list and transplant information, and a list of all transplant hospitals.  Ask questions about the Organ Procurement and Transplantation Network (OPTN) web site (http://optn.transplant.hrsa.gov/), the [facility]OS Web site (http://unos.org/), or the  [facility] web site for living donors and transplant recipients. (http://www.transplantliving.org/).  Learn how [facility] and the OPTN can help you.  Talk about any concerns that you may have with a transplant hospital.    [employer] is a not-for-profit organization that provides the administrative services for the national OPTN under federal contract to the Health Resources and Services Administration (HRSA), an agency under the U.S. Department of Health and Human Services (HHS).    [facility] and the OPTN are responsible for:    Providing educational material for patients, the public, and professionals.  Raising awareness of the need for donated organs and tissue.  Writing organ transplant policy with help from transplant professionals, transplant patients, transplant candidates, donor families, living donors, and the public.  Coordinating organ procurement, matching, and placement.  Collecting information about every organ transplant and donation that occurs in the [place].    Remember, you should contact your transplant hospital directly if you have questions or concerns about your own medical care including medical records, work-up progress, and test results.    [facility] is not your transplant hospital, and staff at [facility] will not be able to transfer you to your transplant hospital, so keep your transplant hospital’s phone number handy.    However, while you research your transplant needs and learn as much as you can about transplantation and donation, we welcome your call to our toll-free patient services line at [phone].      [facility] PIL Final Rev 1-

## 2024-10-02 ENCOUNTER — DOCUMENTATION (OUTPATIENT)
Dept: TRANSPLANT | Facility: HOSPITAL | Age: 34
End: 2024-10-02
Payer: COMMERCIAL

## 2024-10-08 ENCOUNTER — TELEPHONE (OUTPATIENT)
Dept: TRANSPLANT | Facility: HOSPITAL | Age: 34
End: 2024-10-08
Payer: COMMERCIAL

## 2024-10-09 ENCOUNTER — DOCUMENTATION (OUTPATIENT)
Dept: PHYSICAL THERAPY | Facility: CLINIC | Age: 34
End: 2024-10-09
Payer: COMMERCIAL

## 2024-10-09 NOTE — PROGRESS NOTES
Physical Therapy                 Therapy Communication Note    Patient Name: Brittany Chavez  MRN: [id number]  Department:   Room: Room/bed info not found  Today's Date: 10/9/2024     Discipline: Physical Therapy    Missed Visit Reason:  no show    Missed Time: No Show    Comment:

## 2024-10-14 ENCOUNTER — DOCUMENTATION (OUTPATIENT)
Dept: TRANSPLANT | Facility: HOSPITAL | Age: 34
End: 2024-10-14

## 2024-10-14 ENCOUNTER — CLINICAL SUPPORT (OUTPATIENT)
Dept: TRANSPLANT | Facility: HOSPITAL | Age: 34
End: 2024-10-14
Payer: COMMERCIAL

## 2024-10-14 DIAGNOSIS — Z00.5 TRANSPLANT DONOR EVALUATION: Primary | ICD-10-CM

## 2024-10-14 PROCEDURE — 90834 PSYTX W PT 45 MINUTES: CPT | Performed by: PSYCHOLOGIST

## 2024-10-14 NOTE — PROGRESS NOTES
Brittany came in to see myself and Dr. Yip this morning in order to clarify her mental health history and discuss candidacy. Brittany denied any suicidal ideation or attempt after 2019. She said that she remembers having anxiety but not wanting to harm herself. Brittany denied being on any antidepressants currently and does not follow with a mental health provider. We discussed her motivation to donate. She said that she wants to give her recipient a kidney to get him off of dialysis and make his life better and more active. Brittany said that her family believes that he will do better with a living donor kidney. Reviewed with Brittany that she is not her recipient's only option for transplant. Discussed the  donor waitlist and that his dialysis time counts towards his waiting time. With his years of dialysis he could get transplanted sooner. Brittany said that she would okay with him getting a  donor kidney transplant. At this time, we will re-present her at committee in a couple of weeks and discuss with the team our conversation with her.

## 2024-10-20 NOTE — PROGRESS NOTES
"BEHAVIORAL HEALTH: PSYCHOLOGICAL ASSESSMENT FOR LIVING DONATION - UPDATE    Patient's Name: Brittany Chavez  :  1990  MRN:  [id number]    Diagnosis: Transplant donor evaluation  Intended recipient: father    Date of Service: 10/14/2024    Start Time: 10:00 am   End Time: 10:45 am  Location: Transplant [place], [facility]     Presenting information: Ms. Chavez was seen in the transplant clinic as part of follow-up from my initial assessment performed on 2024. At that time, she reported a history of psychiatric issues, including suicidal ideation, that had resulted in hospitalization. After meeting with her, I found notes indicating that as recently as 2023 she had reported just months earlier experiencing significant psychological distress. From a note dated 3/10/2023 by provider AGUSTIN Benedict who conducted an urgent behavioral health/mental health assessment:    \"I have some issues that are very disturbing sometimes that are messing up my everyday life. My doctor referred me to get the therapy. \" Client reported that she is looking to engage in both counseling and psychiatry. Client endorsed frequent SI and has on two separate occassions at the end of the summer  had a pistol and thought about ending her life and at the end of  gathered a bunch of pills with the intentions of overdosing. Client reported that in both these situations she did not go through with either plan because she thought about her daughter and being there for her child. Client reportedly has a plan should she decide to act on thoughts of not wanting to be alive. Client was hesitant, but shared that her plan would be to overdose on prescription medication. Client denied wanting to hurt herself currently and does not have any intentions on acting on thoughts/carrying out her plan.     I shared with Ms. Chavez today, in the presence also of Supriya Mccormack RN (donor coordinator) " "that the living donor team had met, and in the spirit of \"First, do no harm\" the team felt that moving forward with a living donor surgery would be too psychologically taxing for someone who had so recently experienced such psychological pain as had been described. She listened non-defensively and then shared that she had indeed experienced suicidal ideation when she was 23 years old, but not in . She heard and understood my explanation about not wanting to place undue stress on her that the living donor surgery could create, but she insisted it had been years since she had experienced problems like those described in notes from . She recalled having followed with someone for several sessions at that time, but continued to deny suicidal thoughts or intent associated with that time.    Regarding her feelings about donation, she said she is still as invested as she has always been in becoming a donor for her father. She acknowledged that no other siblings have come forward, possibly because of health issues, and said she was not bothered by this or felt pressure to come forward. She noted she is the \"baby\" of the family. When reminded that her father has the option of receiving a  donor kidney, she related that if all things were equal and he had a reasonable  donor kidney available to him at the same time he could have a living donor transplant, she would probably opt for him to take the  donor kidney. She shared that her mother thinks that the living donor kidney would be the best option for him, so the family has felt that they should push for it. We acknowledged that living donor kidneys can last longer than  donor kidneys, but this is not always the case, and we have to consider the sacrifice of the living donor.. She said her job situation is more secure than it was before, but indeed a living donor surgery would involve some rearrangement of resources that would make it " preferable for her to avoid surgery if he could get another kidney. She reiterated that she would like to get him off dialysis as soon as possible.     IMPRESSIONS AND RECOMMENDATIONS  Ms. Chavez was advised that the team would need to reconvene to discuss the case further. While I am impressed with her dedication and apparent current psychological stability, the notes in our system do describe compellingly a concerning picture of her psychological distress from the year 2022 and not when she was 23 years old. I am still concerned that this proposed living donor surgery is too psychologically risky at this point in her life.    Claribel Yip, PhD  Clinical Psychologist, Transplant [place]  Clinical Professor, Department of Psychiatry  [facility]      Note to patient: The 21st Century Cares Act makes medical notes like these available to patients in the interest of transparency; however, be advised this is a medical document. It is intended as communication with other medical professionals, not as a form of communication from the practitioner to the patient. It is written in medical language and may contain abbreviations or verbiage that are unfamiliar. It may appear blunt or direct. Medical documents are intended to carry relevant information, facts as evident, and the clinical opinion of the practitioner.

## 2024-10-21 ENCOUNTER — TREATMENT (OUTPATIENT)
Dept: PHYSICAL THERAPY | Facility: CLINIC | Age: 34
End: 2024-10-21
Payer: COMMERCIAL

## 2024-10-21 DIAGNOSIS — S16.1XXD STRAIN OF NECK MUSCLE, SUBSEQUENT ENCOUNTER: ICD-10-CM

## 2024-10-21 PROCEDURE — 97140 MANUAL THERAPY 1/> REGIONS: CPT | Mod: GP

## 2024-10-21 PROCEDURE — 97112 NEUROMUSCULAR REEDUCATION: CPT | Mod: GP

## 2024-10-21 PROCEDURE — 97110 THERAPEUTIC EXERCISES: CPT | Mod: GP

## 2024-10-21 NOTE — PROGRESS NOTES
Patient Name Brittany Chavez   MRN: [id number]  Today's Date: 10/21/2024  Time Calculation  Start Time: 0847  Stop Time: 0933  Time Calculation (min): 46 min    Insurance:    (per information provided by  pre-cert team)  - [employer] [facility] , 30 V PCY 0 used 100%  -authorization required: no  Visit #: 3     Therapy Diagnoses:   1. Strain of neck muscle, subsequent encounter  Follow Up In Physical Therapy    Follow Up In Physical Therapy          General:  Reason for visit: c-spine pain  Referred by: Dr Leena Berman MD appt:  unknown  Preferred Name:  Brittany  Script:  eval and treat  Onset Date:  6/1/24  Email: [email or web address]  Re-eval: n/a    Assessment:  Patient tolerated treatment well, but had some increased soreness with the t-band exercises and the manual treatments.  She reports overall feeling improved after the treatment though.   Patient needs continued work on/skilled PT for: pain reduction, mobility and functional/ postural strength to address remaining functional, objective and subjective deficits to allow them to return to prior /optimal level of function with ADLs.  Patient is progressing with goals: pain reduction, ROM, flexibility, strength, HEP  Skilled care:  therapeutic exercises, NMR, manual techniques, HEP/ pt education    Plan:    Continue to progress per poc:   NV add door pec stretch    Subjective:   Patient reports:  her neck still feels tight and painful with certain movements.  She has been doing the HEP 2x a week on average and generally tolerates the exercises well.     Have you fallen since last visit:  no    Precautions: no fall risk   PMHx: asthma, bursitis B shoulders     Medications for pain: muscle relaxer     Diagnostic Tests: x-ray    Pain:  6/10  Location/Type of pain:  B upper trap region    HEP compliance/understanding:  yes    Objective:   Objective Measurements: n/a    Treatment:   **= HEP  NV= Next visit  np= not performed  nb= non-billable  G= group  "HEP= discharged to HEP      Therapeutic Exercise:     18 minutes  Nu-step: L3 x 5'  Scapular retraction: 5\" x 10 ** NP  Chin tucks: 5\" x 10 ** NP  Seated gentle upper trap stretch: 15\" x 3 each **  Seated gentle levator scap stretch: 15\" x 3 each **  Standing cane extension: 5\" x 10 **    Manual Therapy:     16 minutes  Seated B upper trap region DTM/ TPR NP  Supine suboccipital release, manual traction, lateral glides and TPR c-spine muscles    Neuromuscular Re-education:    12 minutes  T -band rows: green 10 x 3 **  T-band B ER/ scapular retraction: yellow 10 x 2 **  T-band scapular depression: green 10 x 2 **    Modalities:       Vasopneumatic Device       minutes  Electrical Stimulation          minutes  Ultrasound            minutes  Iontophoresis                     minutes  Cold Pack            minutes  Mechanical Traction           minutes    Self Care Home Management:    minutes      Education:    Pt ed on therapeutic exercise technique, posture, HEP  HEP Progression:    Access Code: [email or web address]  URL: [email or web address]  Date: 10/21/2024  Prepared by: Bianka Geiger    Exercises  - Bilateral Scapular Depression with Anchored Resistance - Straight Arm  - 4 x weekly - 2-3 sets - 10 reps    Access Code: [email or web address]  URL: [email or web address]  Date: 09/30/2024  Prepared by: Bianka Geiger    Exercises  - Seated Gentle Upper Trapezius Stretch  - 1-2 x daily - 7 x weekly - 2-3 sets - 20 hold  - Gentle Levator Scapulae Stretch  - 1-2 x daily - 7 x weekly - 2-3 sets - 20 hold  - Standing Shoulder Extension with Dowel  - 1-2 x daily - 7 x weekly - 1-2 sets - 5-10 reps - 5 hold  - Standing Shoulder Row with Anchored Resistance  - 1 x daily - 7 x weekly - 1-3 sets - 10 reps  - Shoulder External Rotation and Scapular Retraction with Resistance  - 1 x daily - 7 x weekly - 1-2 sets - 10 reps - 3-5 hold     "

## 2024-10-24 ENCOUNTER — OFFICE VISIT (OUTPATIENT)
Dept: URGENT CARE | Age: 34
End: 2024-10-24
Payer: COMMERCIAL

## 2024-10-24 VITALS
BODY MASS INDEX: 28.89 KG/M2 | HEIGHT: 61 IN | OXYGEN SATURATION: 98 % | TEMPERATURE: 98.9 F | RESPIRATION RATE: 18 BRPM | HEART RATE: 77 BPM | SYSTOLIC BLOOD PRESSURE: 120 MMHG | WEIGHT: 153 LBS | DIASTOLIC BLOOD PRESSURE: 80 MMHG

## 2024-10-24 DIAGNOSIS — J06.9 VIRAL URI WITH COUGH: ICD-10-CM

## 2024-10-24 LAB
POC RAPID STREP: NEGATIVE
POC SARS-COV-2 AG BINAX: NORMAL
POC SARS-COV-2 AG BINAX: NORMAL

## 2024-10-24 RX ORDER — PREDNISONE 20 MG/1
20 TABLET ORAL DAILY
Qty: 10 TABLET | Refills: 0 | Status: SHIPPED | OUTPATIENT
Start: 2024-10-24 | End: 2024-10-24

## 2024-10-24 RX ORDER — PREDNISONE 20 MG/1
40 TABLET ORAL DAILY
Qty: 10 TABLET | Refills: 0 | Status: SHIPPED | OUTPATIENT
Start: 2024-10-24 | End: 2024-10-29

## 2024-10-24 ASSESSMENT — PAIN SCALES - GENERAL: PAINLEVEL_OUTOF10: 8

## 2024-10-24 NOTE — PROGRESS NOTES
Subjective   Patient ID: Brittany Chavez is a 34 y.o. female. They present today with a chief complaint of Sore Throat and Headache.    History of Present Illness  Patient reports ~1 day of cold like symptoms  Notes sore throat, headache, body aches, chills  Notes significant sinus pain and sore throat, feels like the latter is the most painful for her  Reports cough, wheezing, SOB   Notes hx of asthma  Tried her albuterol inhaler, doesn't take it frequently      Past Medical History  Allergies as of 10/24/2024 - Reviewed 10/24/2024   Allergen Reaction Noted    Naproxen Other, Rash, and Swelling 11/15/2023       (Not in a hospital admission)       Past Medical History:   Diagnosis Date    Chlamydial infection, unspecified     Chlamydia    Dysuria 10/27/2014    Dysuria    Encounter for fitting and adjustment of spectacles and contact lenses     Contact lens/glasses fitting    Encounter for gynecological examination (general) (routine) without abnormal findings 05/15/2015    Well woman exam    Encounter for insertion of intrauterine contraceptive device     Encounter for insertion of mirena IUD    Encounter for insertion of intrauterine contraceptive device     Encounter for insertion of mirena IUD    Encounter for supervision of normal first pregnancy, unspecified trimester [facility]     Primigravida    Other conditions influencing health status     History of pregnancy    Other problems related to lifestyle 07/12/2016    Other problems related to lifestyle    Other specified health status     Last menstrual period (LMP) > 10 days ago    Other specified postprocedural states     Required emergent intubation    Personal history of other diseases of the nervous system and sense organs     History of sleep disturbance    Personal history of other diseases of the respiratory system     Personal history of asthma    Personal history of other infectious and parasitic diseases     History of sexually transmitted disease     "Personal history of other mental and behavioral disorders     History of depression    Type A blood, Rh negative     Blood type A-    Urogenital trichomoniasis, unspecified     Trichs - trichomonas vaginalis infection       Past Surgical History:   Procedure Laterality Date    OTHER SURGICAL HISTORY  09/29/2014    Dental Surgery    OTHER SURGICAL HISTORY  10/12/2018    Hysteroscopy With Removal Of Impacted Foreign Body        reports that she has never smoked. She has never used smokeless tobacco. She reports current drug use. Drug: Marijuana. She reports that she does not drink alcohol.                               Objective    Vitals:    10/24/24 1435   BP: 120/80   Pulse: 77   Resp: 18   Temp: 37.2 °C (98.9 °F)   TempSrc: Oral   SpO2: 98%   Weight: 69.4 kg (153 lb)   Height: 1.549 m (5' 1\")     Patient's last menstrual period was 10/22/2024.    Physical Exam  Constitutional:       General: She is not in acute distress.     Appearance: Normal appearance. She is not toxic-appearing or diaphoretic.   HENT:      Right Ear: Tympanic membrane, ear canal and external ear normal. There is no impacted cerumen.      Left Ear: Tympanic membrane, ear canal and external ear normal. There is no impacted cerumen.      Nose: No rhinorrhea.      Mouth/Throat:      Pharynx: Posterior oropharyngeal erythema present. No oropharyngeal exudate.      Comments: Slight swelling of bilateral tonsils   Eyes:      General: No scleral icterus.        Right eye: No discharge.         Left eye: No discharge.      Extraocular Movements: Extraocular movements intact.   Cardiovascular:      Rate and Rhythm: Normal rate and regular rhythm.   Pulmonary:      Effort: Pulmonary effort is normal. No respiratory distress.      Breath sounds: No wheezing or rales.      Comments: Nonproductive cough on exam  Musculoskeletal:      Cervical back: Normal range of motion.   Neurological:      Mental Status: She is alert.   Psychiatric:         Mood and " Affect: Mood normal.         Behavior: Behavior normal.         Thought Content: Thought content normal.      Comments: Pleasant         Procedures    Point of Care Test & Imaging Results from this visit:  Results for orders placed or performed in visit on 10/24/24   POCT Covid-19 Rapid Antigen    Collection Time: 10/24/24  2:40 PM   Result Value Ref Range    POC LÓPEZ-COV-2 AG  Presumptive negative test for SARS-CoV-2 (no antigen detected)     Presumptive negative test for SARS-CoV-2 (no antigen detected)   POCT Covid-19 Rapid Antigen    Collection Time: 10/24/24  2:43 PM   Result Value Ref Range    POC LÓPEZ-COV-2 AG  Presumptive negative test for SARS-CoV-2 (no antigen detected)     Presumptive negative test for SARS-CoV-2 (no antigen detected)   POCT rapid strep A manually resulted    Collection Time: 10/24/24  2:45 PM   Result Value Ref Range    POC Rapid Strep Negative Negative       Diagnostic study results (if any) were reviewed by David Osman MD.    Assessment/Plan   Allergies, medications, history, and pertinent labs/EKGs/Imaging reviewed by David Osman MD.     Medical Decision Making:    Patient's symptoms are consistent with URI likely 2/2 cold virus etiology. POC viral swabs and strep are negative.  Afebrile, vital signs stable satting 98% on RA. Given hx of asthma, through shard decision making, patient preference, will send prednisone burst. Albuterol PRN. Encourage supportive care measures such as salt water gargles, steam/humidifier, drink plenty of fluids, Mucinex, buckwheat honey, vitamin C/D3, elderberry, and zinc etc. Follow up as needed. ER if symptoms worsen      Orders and Diagnoses  Diagnoses and all orders for this visit:  Viral URI with cough  -     POCT Covid-19 Rapid Antigen  -     POCT Covid-19 Rapid Antigen  -     POCT rapid strep A manually resulted  -     predniSONE (Deltasone) 20 mg tablet; Take 2 tablets (40 mg) by mouth once daily for 5 days.      Patient disposition:  Home      Medical Admin Record      Follow Up Instructions  No follow-ups on file.    Electronically signed by David Osman MD  4:24 PM

## 2024-10-29 ENCOUNTER — COMMITTEE REVIEW (OUTPATIENT)
Dept: TRANSPLANT | Facility: HOSPITAL | Age: 34
End: 2024-10-29
Payer: COMMERCIAL

## 2024-10-29 ENCOUNTER — TELEPHONE (OUTPATIENT)
Dept: TRANSPLANT | Facility: HOSPITAL | Age: 34
End: 2024-10-29
Payer: COMMERCIAL

## 2024-11-04 ENCOUNTER — APPOINTMENT (OUTPATIENT)
Dept: PHYSICAL THERAPY | Facility: CLINIC | Age: 34
End: 2024-11-04
Payer: COMMERCIAL

## 2024-11-11 ENCOUNTER — TREATMENT (OUTPATIENT)
Dept: PHYSICAL THERAPY | Facility: CLINIC | Age: 34
End: 2024-11-11
Payer: COMMERCIAL

## 2024-11-11 DIAGNOSIS — S16.1XXD STRAIN OF NECK MUSCLE, SUBSEQUENT ENCOUNTER: ICD-10-CM

## 2024-11-11 PROCEDURE — 97140 MANUAL THERAPY 1/> REGIONS: CPT | Mod: GP

## 2024-11-11 PROCEDURE — 97112 NEUROMUSCULAR REEDUCATION: CPT | Mod: GP

## 2024-11-11 PROCEDURE — 97110 THERAPEUTIC EXERCISES: CPT | Mod: GP

## 2024-11-11 NOTE — PROGRESS NOTES
Patient Name Brittany Chavez   MRN: [id number]  Today's Date: 11/11/2024  Time Calculation  Start Time: 0850  Stop Time: 0932  Time Calculation (min): 42 min    Insurance:    (per information provided by  pre-cert team)  - [employer] , 30 V PCY 0 used 100%  -authorization required: no  Visit #: 4     Therapy Diagnoses:   1. Strain of neck muscle, subsequent encounter  Follow Up In Physical Therapy          General:  Reason for visit: c-spine pain  Referred by: Dr Leena Berman MD appt:  unknown  Preferred Name:  Brittany  Script:  eval and treat  Onset Date:  6/1/24  Email: [email or web address]  Re-eval: n/a    Assessment:  Patient tolerated treatment well, and reported her neck felt a little loser at the end of the session. Her shoulder pain was the same, 7/10.  She reported increased shoulder pain with all the exercises done today.  She had pain with RC impingement tests and had pain with palpation of the supraspinatus tendon.  Patient needs continued work on/skilled PT for: pain reduction, mobility and functional/ postural strength to address remaining functional, objective and subjective deficits to allow them to return to prior /optimal level of function with ADLs.  Patient is progressing with goals: pain reduction, ROM, flexibility, strength, HEP  Skilled care:  therapeutic exercises, NMR, manual techniques, HEP/ pt education    Plan:    Continue to progress per poc:   NV add door pec stretch  NV add t-band shoulder IR and add    Subjective:   Patient reports:  her neck and R shoulder pain are worse recently. She has been sleeping on the couch due to her father's recent surgery and she has had more pain since then.  Her R UE is numb and the R shoulder is painful when she wakes up in the morning.    Have you fallen since last visit:  no    Precautions: no fall risk   PMHx: asthma, bursitis B shoulders     Medications for pain: muscle relaxer     Diagnostic Tests: x-ray    Pain:  8/10  "c-spine, 7-10/10 R shoulder, R shoulder is 10 when she wakes up    HEP compliance/understanding:  yes    Objective:   Objective Measurements:   Special tests: R RC hahn and empty can test (+)  ROM: shoulder flexion R 120 degrees pain, abduction R 90 degrees pain  Strength: shoulder flexion 4/5 pain    Treatment:   **= HEP  NV= Next visit  np= not performed  nb= non-billable  G= group HEP= discharged to HEP      Therapeutic Exercise:     18 minutes  Nu-step: L2 x 5'  Scapular retraction: 5\" x 10 ** NP  Chin tucks: 5\" x 10 ** NP  Seated gentle upper trap stretch: 15\" x 3 each ** NP  Seated gentle levator scap stretch: 15\" x 3 each ** NP  Standing cane extension: 5\" x 10 **  Seated cane IR/ ER: x 10 **  Pulleys flexion: R x 10  AROM cervical rotation: x 10 each   Pendulums CW/CCW: 0# x 10 each **    Manual Therapy:     16 minutes  Seated B upper trap region DTM/ TPR, manual traction, CFM supraspinatus tendon  Supine suboccipital release, manual traction, lateral glides and TPR c-spine muscles NP    Neuromuscular Re-education:    8 minutes  T -band rows: yellow 10 x 1 **  T-band B ER/ scapular retraction: yellow 10 x 1 **  T-band scapular depression: green 10 x 2 ** NP    Modalities:       Vasopneumatic Device       minutes  Electrical Stimulation          minutes  Ultrasound            minutes  Iontophoresis                     minutes  Cold Pack            minutes  Mechanical Traction           minutes    Self Care Home Management:    minutes      Education:    Pt ed on therapeutic exercise technique, posture, HEP  HEP Progression:    Access Code: [email or web address]  URL: [email or web address]  Date: 10/21/2024  Prepared by: Bianka Geiger    Exercises  - Bilateral Scapular Depression with Anchored Resistance - Straight Arm  - 4 x weekly - 2-3 sets - 10 reps    Access Code: [email or web address]  URL: [email or web address]  Date: 09/30/2024  Prepared by: Bianka Geiger    Exercises  - Seated " Gentle Upper Trapezius Stretch  - 1-2 x daily - 7 x weekly - 2-3 sets - 20 hold  - Gentle Levator Scapulae Stretch  - 1-2 x daily - 7 x weekly - 2-3 sets - 20 hold  - Standing Shoulder Extension with Dowel  - 1-2 x daily - 7 x weekly - 1-2 sets - 5-10 reps - 5 hold  - Standing Shoulder Row with Anchored Resistance  - 1 x daily - 7 x weekly - 1-3 sets - 10 reps  - Shoulder External Rotation and Scapular Retraction with Resistance  - 1 x daily - 7 x weekly - 1-2 sets - 10 reps - 3-5 hold    Access Code: [email or web address]  URL: [email or web address]  Date: 11/11/2024  Prepared by: Bianka Geiger    Exercises  - Circular Shoulder Pendulum with Table Support  - 2-3 x daily - 7 x weekly - 1-2 sets - 10-20 reps  - Standing Shoulder External Rotation AAROM with Dowel  - 1-2 x daily - 7 x weekly - 1 sets - 5-10 reps - 2-5 hold

## 2024-11-18 ENCOUNTER — DOCUMENTATION (OUTPATIENT)
Dept: PHYSICAL THERAPY | Facility: CLINIC | Age: 34
End: 2024-11-18
Payer: COMMERCIAL

## 2024-11-18 ENCOUNTER — APPOINTMENT (OUTPATIENT)
Dept: PHYSICAL THERAPY | Facility: CLINIC | Age: 34
End: 2024-11-18
Payer: COMMERCIAL

## 2024-11-18 NOTE — PROGRESS NOTES
Physical Therapy                 Therapy Communication Note    Patient Name: Brittany Chavez  MRN: [id number]  Department:   Room: Room/bed info not found  Today's Date: 11/18/2024     Discipline: Physical Therapy    Missed Visit Reason:  Canceled via [employer]    Missed Time: Cancel    Comment:

## 2024-11-25 ENCOUNTER — APPOINTMENT (OUTPATIENT)
Dept: PHYSICAL THERAPY | Facility: CLINIC | Age: 34
End: 2024-11-25
Payer: COMMERCIAL

## 2024-12-10 ENCOUNTER — APPOINTMENT (OUTPATIENT)
Dept: INTEGRATIVE MEDICINE | Facility: CLINIC | Age: 34
End: 2024-12-10
Payer: COMMERCIAL

## 2024-12-10 DIAGNOSIS — M79.9 POSTURAL STRAIN: ICD-10-CM

## 2024-12-10 DIAGNOSIS — M79.10 MYALGIA: ICD-10-CM

## 2024-12-10 DIAGNOSIS — M99.01 SEGMENTAL AND SOMATIC DYSFUNCTION OF CERVICAL REGION: Primary | ICD-10-CM

## 2024-12-10 DIAGNOSIS — M99.02 SEGMENTAL AND SOMATIC DYSFUNCTION OF THORACIC REGION: ICD-10-CM

## 2024-12-10 DIAGNOSIS — M99.00 SEGMENTAL AND SOMATIC DYSFUNCTION OF HEAD REGION: ICD-10-CM

## 2024-12-10 DIAGNOSIS — M54.2 CERVICALGIA: ICD-10-CM

## 2024-12-10 PROCEDURE — 98941 CHIROPRACT MANJ 3-4 REGIONS: CPT | Performed by: CHIROPRACTOR

## 2024-12-10 PROCEDURE — 99203 OFFICE O/P NEW LOW 30 MIN: CPT | Performed by: CHIROPRACTOR

## 2024-12-10 ASSESSMENT — ENCOUNTER SYMPTOMS
DIZZINESS: 0
FEVER: 0
ARTHRALGIAS: 0
VOMITING: 0
HEADACHES: 1
FLANK PAIN: 0
WEAKNESS: 0
BACK PAIN: 0
TROUBLE SWALLOWING: 0
CHEST TIGHTNESS: 0
LIGHT-HEADEDNESS: 0
NECK STIFFNESS: 1
UNEXPECTED WEIGHT CHANGE: 0
CHILLS: 0
NECK PAIN: 1
SHORTNESS OF BREATH: 0
MYALGIAS: 1
JOINT SWELLING: 0
FATIGUE: 0
NUMBNESS: 0
DIFFICULTY URINATING: 0

## 2024-12-10 NOTE — PROGRESS NOTES
Subjective   Patient ID: Brittany Chavez is a 34 y.o. female who presents today, December 10, 2024 for a new patient evaluation and treatment of neck pain.    (1/15) [facility]    Chiropractic Medicine HPI:  Provacative factors include : lying down, twisting, driving  Palliative factors incude : nothing  Pain is described as : ache, stiff   Previous treatment for complaint has included : heat, stretching, NSAIDS, Rx medications, Physical Therapy  Patient denies : trauma/accidents/injuries/falls (last 6 months), numbness/tingling, bladder weakness, bowel weakness, difficulty walking, instability, radiating pain into the distal extremity, catching, clicking, grinding, popping  Patient rates severity of pain at : 7/10 on a numerical pain scale  Patient rates least severe pain at : 5/10 on a numerical pain scale  Patient rates most severe pain at : 10/10 on a numerical pain scale   Completed Oswestry Disability Index prior to visit: yes     Brittany presents for evaluation and treatment of neck pain. Patients states that symptoms began in June 2024 upon waking. Patient denies any injury/trauma/accident or previous neck symptoms. She states that pain starts at the base of the skull and goes down to the top of the shoulders, bilaterally. She states that she has had an increase in headaches since neck pain began and she gets 2-3 headaches a month currently. She states that lying down, trying to sleep, and moving her head while driving increase symptoms while nothing has helped relieve symptoms. Patient states that she has been in PT for 2 months and has not noted any relief in symptoms from treatment. She states that urgent care prescribed NSAIDs and muscle relaxants, which she states did not decrease symptoms. Patient denies any difficulty speaking/swallowing, vision changes, bowel/bladder issues, chest pain/shortness of breath, numbness/tingling. Patient is new to chiropractic care.            Objective   Review of Systems    Constitutional:  Negative for chills, fatigue, fever and unexpected weight change.   HENT:  Negative for trouble swallowing.    Eyes:  Negative for visual disturbance.   Respiratory:  Negative for chest tightness and shortness of breath.    Cardiovascular:  Negative for chest pain and leg swelling.   Gastrointestinal:  Negative for vomiting.   Genitourinary:  Negative for difficulty urinating and flank pain.   Musculoskeletal:  Positive for myalgias, neck pain and neck stiffness. Negative for arthralgias, back pain, gait problem and joint swelling.   Neurological:  Positive for headaches. Negative for dizziness, weakness, light-headedness and numbness.   Psychiatric/Behavioral:  Negative for self-injury and suicidal ideas.    All other systems reviewed and are negative.    Physical Exam  Constitutional:       General: She is not in acute distress.     Appearance: Normal appearance.       HENT:      Head: Normocephalic and atraumatic.   Musculoskeletal:      Cervical back: Tenderness present. Pain with movement present. Decreased range of motion.   Neurological:      General: No focal deficit present.      Mental Status: She is alert and oriented to person, place, and time.      Cranial Nerves: No dysarthria or facial asymmetry.      Sensory: Sensation is intact.      Motor: Motor function is intact.      Coordination: Coordination is intact.      Gait: Gait is intact.   Psychiatric:         Attention and Perception: Attention normal.         Mood and Affect: Mood normal.         Speech: Speech normal.         Behavior: Behavior is cooperative.        Spine Musculoskeletal Exam    Gait    Gait is normal.    Inspection    Shoulder elevation: right    Cervical Spine    Cervical spine inspection additional comments: Mild forward head carriage and bilateral rounded shoulders.    Palpation    Cervical Spine    Tenderness: present      Paraspinous: right and left      Trapezius: right and left      Suboccipital triangle:  right and left    Right      Muscle tone: increased    Left      Muscle tone: increased    Range of Motion    Cervical Spine       Cervical flexion: 1-2 finger breadths. Cervical flexion detail: no pain.     Cervical extension: reduced extension (0-25%). Cervical extension detail: pain.       Right      Lateral bending: reduced bend (0-25%). Lateral bending detail: no pain.       Lateral rotation: reduced rotation (0-25%). Lateral rotation detail: no pain.       Left      Lateral bending: reduced bend (26-50%). Lateral bending detail: pain.       Lateral rotation: reduced rotation (26-50%). Lateral rotation detail: pain.      Strength    Cervical Spine    Cervical spine motor exam is normal.    Sensory    Cervical Spine    Cervical spine sensation is normal.    Special Tests    Cervical Spine    Cervical distraction - neck: decreased pain    General    Neurological: alert       Orthopedic Tests:  Cervical: Maximum compression Bilateral and with local pain.    Segmental Joint(s): Segmental joint dysfunction was assessed with motion palpation and is identified in the following areas:  Cervical : C0 C1 C2 C4 C6 C7  Thoracic : T1 and T2.    Assessment/Plan   Today's Treatment Included: Spinal manipulation to the following regions of segmental dysfunction identified on examination, using age-appropriate and injury specific force. Segmental Joint(s) Cervical : C0 C1 C2 C4 C6 C7 Segmental Joint(s) Thoracic : T1 and T2.   Chiropractic manipulation treatment techniques utilized: Diversified CMT and Cervical Manual Traction  Soft tissue mobilization techniques utilized during treatment: Ischemic compression    Soft-tissue mobilization was performed in the following areas:   Suboccipital bilateral, Cervical paraspinal mm. bilateral, Upper Trapezius bilateral, and Levator Scap. bilateral  Thoracic Paraspinal mm. bilateral              Patient was shown upper trap stretch.    Treatment Plan: The patient and I discussed the risks  and benefits of Chiropractic Care.  Consent for care was given both written and orally by the patient.  Based on the patient's subjective complaints along with the examination findings, it is advised that a course of Chiropractic Treatment by initiated in the form of:   Chiropractic Manipulation (CMT)  Integrative Dry Needing (IDN)  Chiropractic treatment recommended at a frequency of 1 times per week for 4 weeks  until symptoms are mild or manageable  The goals of the treatment will be to: decrease pain, increase activity, increase range of motion, improve sleep, improve quality of life, and decrease muscular hypertonicity  The patient tolerated today's treatment with little or no additional discomfort and was instructed to contact the office for questions or concerns.   Follow up as scheduled.

## 2024-12-18 ENCOUNTER — OFFICE VISIT (OUTPATIENT)
Dept: URGENT CARE | Age: 34
End: 2024-12-18
Payer: COMMERCIAL

## 2024-12-18 VITALS
OXYGEN SATURATION: 97 % | DIASTOLIC BLOOD PRESSURE: 73 MMHG | SYSTOLIC BLOOD PRESSURE: 115 MMHG | HEART RATE: 83 BPM | RESPIRATION RATE: 16 BRPM | TEMPERATURE: 98.5 F

## 2024-12-18 DIAGNOSIS — N76.2 CELLULITIS OF LABIA: Primary | ICD-10-CM

## 2024-12-18 PROCEDURE — 99213 OFFICE O/P EST LOW 20 MIN: CPT | Performed by: FAMILY MEDICINE

## 2024-12-18 PROCEDURE — 1036F TOBACCO NON-USER: CPT | Performed by: FAMILY MEDICINE

## 2024-12-18 RX ORDER — MUPIROCIN 20 MG/G
OINTMENT TOPICAL
Qty: 22 G | Refills: 0 | Status: SHIPPED | OUTPATIENT
Start: 2024-12-18 | End: 2024-12-25

## 2024-12-18 RX ORDER — SULFAMETHOXAZOLE AND TRIMETHOPRIM 800; 160 MG/1; MG/1
1 TABLET ORAL 2 TIMES DAILY
Qty: 14 TABLET | Refills: 0 | Status: SHIPPED | OUTPATIENT
Start: 2024-12-18

## 2024-12-18 NOTE — TELEPHONE ENCOUNTER
Pharmacy calling stating they need further clarification on directions for the   Clindamycin-benzoyl medication..  They need to know how often

## 2024-12-18 NOTE — PROGRESS NOTES
"Subjective   Patient ID: Brittany Chavez is a 34 y.o. female. They present today with a chief complaint of Other (PT presents with a ingrown hair or cyst on her vagina in pain.).    History of Present Illness  HPI  Patient presents with a cyst or \"ingrown hair\" on her vaginal labia for the past 2 to 3 days.  Patient has had this in the past and was treated by her doctor with mupirocin's cream.  No fevers or chills.  No injury to the area reported.  No vaginal discharge.  She denies any STD exposures.  Past Medical History  Allergies as of 12/18/2024 - Reviewed 12/18/2024   Allergen Reaction Noted    Naproxen Other, Rash, and Swelling 11/15/2023       (Not in a hospital admission)       Past Medical History:   Diagnosis Date    Chlamydial infection, unspecified     Chlamydia    Dysuria 10/27/2014    Dysuria    Encounter for fitting and adjustment of spectacles and contact lenses     Contact lens/glasses fitting    Encounter for gynecological examination (general) (routine) without abnormal findings 05/15/2015    Well woman exam    Encounter for insertion of intrauterine contraceptive device     Encounter for insertion of mirena IUD    Encounter for insertion of intrauterine contraceptive device     Encounter for insertion of mirena IUD    Encounter for supervision of normal first pregnancy, unspecified trimester ([facility])     Primigravida    Other conditions influencing health status     History of pregnancy    Other problems related to lifestyle 07/12/2016    Other problems related to lifestyle    Other specified health status     Last menstrual period (LMP) > 10 days ago    Other specified postprocedural states     Required emergent intubation    Personal history of other diseases of the nervous system and sense organs     History of sleep disturbance    Personal history of other diseases of the respiratory system     Personal history of asthma    Personal history of other infectious and parasitic diseases     " History of sexually transmitted disease    Personal history of other mental and behavioral disorders     History of depression    Type A blood, Rh negative     Blood type A-    Urogenital trichomoniasis, unspecified     Trichs - trichomonas vaginalis infection       Past Surgical History:   Procedure Laterality Date    OTHER SURGICAL HISTORY  09/29/2014    Dental Surgery    OTHER SURGICAL HISTORY  10/12/2018    Hysteroscopy With Removal Of Impacted Foreign Body        reports that she has never smoked. She has never used smokeless tobacco. She reports current drug use. Drug: Marijuana. She reports that she does not drink alcohol.    Review of Systems  Review of Systems          As in history of present illness                     Objective    Vitals:    12/18/24 1355   BP: 115/73   BP Location: Left arm   Patient Position: Sitting   Pulse: 83   Resp: 16   Temp: 36.9 °C (98.5 °F)   SpO2: 97%     Patient's last menstrual period was 12/16/2024 (exact date).    Physical Exam  Alert and oriented, no apparent distress  Abdomen soft, nontender, nondistended.  No CVA tenderness  Skin-see  exam   tenderness erythema and swollen skin noted over right labia with no fluctuance blood or discharge noted  Procedures    Point of Care Test & Imaging Results from this visit  No results found for this visit on 12/18/24.   No results found.    Diagnostic study results (if any) were reviewed by Rudi Last MD.    Assessment/Plan   Allergies, medications, history, and pertinent labs/EKGs/Imaging reviewed by Rudi Last MD.     Medical Decision Making  At time of discharge patient was clinically well-appearing and HDS for outpatient management. The patient and/or family was educated regarding diagnosis, supportive care, OTC and Rx medications. The patient and/or family was given the opportunity to ask questions prior to discharge.  They verbalized understanding of my discussion of the plans for treatment, expected course,  indications to return to  or seek further evaluation in ED, and the need for timely follow up as directed.   They were provided with a work/school excuse if requested.    Orders and Diagnoses  Diagnoses and all orders for this visit:  Cellulitis of labia  -     sulfamethoxazole-trimethoprim (Bactrim DS) 800-160 mg tablet; Take 1 tablet by mouth 2 times a day.  -     mupirocin (Bactroban) 2 % ointment; Apply topically 3 times a day for 7 days.      Medical Admin Record      Patient disposition: Home    Electronically signed by Rudi Last MD  2:04 PM

## 2024-12-18 NOTE — PATIENT INSTRUCTIONS
Use antibiotics as directed for the next 7 days  Tylenol and ibuprofen as needed for discomfort  Go to ER for fevers or worsening pain  Follow-up with gynecologist if not resolving in 1 week

## 2024-12-19 ENCOUNTER — HOSPITAL ENCOUNTER (EMERGENCY)
Facility: HOSPITAL | Age: 34
Discharge: HOME | End: 2024-12-19
Payer: COMMERCIAL

## 2024-12-19 VITALS
DIASTOLIC BLOOD PRESSURE: 70 MMHG | TEMPERATURE: 98.4 F | BODY MASS INDEX: 29.48 KG/M2 | HEART RATE: 72 BPM | WEIGHT: 156 LBS | RESPIRATION RATE: 16 BRPM | OXYGEN SATURATION: 98 % | SYSTOLIC BLOOD PRESSURE: 119 MMHG

## 2024-12-19 DIAGNOSIS — N75.0 BARTHOLIN CYST: Primary | ICD-10-CM

## 2024-12-19 PROCEDURE — 96372 THER/PROPH/DIAG INJ SC/IM: CPT | Mod: 59 | Performed by: NURSE PRACTITIONER

## 2024-12-19 PROCEDURE — 99284 EMERGENCY DEPT VISIT MOD MDM: CPT | Mod: 25

## 2024-12-19 PROCEDURE — 2500000004 HC RX 250 GENERAL PHARMACY W/ HCPCS (ALT 636 FOR OP/ED): Performed by: NURSE PRACTITIONER

## 2024-12-19 PROCEDURE — 2500000001 HC RX 250 WO HCPCS SELF ADMINISTERED DRUGS (ALT 637 FOR MEDICARE OP): Performed by: NURSE PRACTITIONER

## 2024-12-19 PROCEDURE — 56420 I&D BARTHOLINS GLAND ABSCESS: CPT | Performed by: NURSE PRACTITIONER

## 2024-12-19 RX ORDER — ACETAMINOPHEN 500 MG
1000 TABLET ORAL EVERY 6 HOURS PRN
Qty: 30 TABLET | Refills: 0 | Status: SHIPPED | OUTPATIENT
Start: 2024-12-19 | End: 2024-12-29

## 2024-12-19 RX ORDER — OXYCODONE HYDROCHLORIDE 5 MG/1
5 TABLET ORAL EVERY 6 HOURS PRN
Qty: 8 TABLET | Refills: 0 | Status: SHIPPED | OUTPATIENT
Start: 2024-12-19 | End: 2024-12-21

## 2024-12-19 RX ORDER — HYDROMORPHONE HYDROCHLORIDE 1 MG/ML
1 INJECTION, SOLUTION INTRAMUSCULAR; INTRAVENOUS; SUBCUTANEOUS ONCE
Status: COMPLETED | OUTPATIENT
Start: 2024-12-19 | End: 2024-12-19

## 2024-12-19 RX ORDER — ACETAMINOPHEN 325 MG/1
975 TABLET ORAL ONCE
Status: COMPLETED | OUTPATIENT
Start: 2024-12-19 | End: 2024-12-19

## 2024-12-19 NOTE — ED PROCEDURE NOTE
Procedure  Incision and Drainage    Performed by: CHRISTO Blue  Authorized by: CHRISTO Blue    Consent:     Consent obtained:  Verbal    Consent given by:  Patient    Risks, benefits, and alternatives were discussed: yes      Risks discussed:  Bleeding, incomplete drainage, pain and infection    Alternatives discussed:  No treatment, observation and referral  Universal protocol:     Procedure explained and questions answered to patient or proxy's satisfaction: yes      Relevant documents present and verified: yes      Immediately prior to procedure, a time out was called: yes      Patient identity confirmed:  Verbally with patient  Location:     Type:  Bartholin cyst    Size:  Right labial    Location:  Anogenital    Anogenital location:  Bartholin's gland  Pre-procedure details:     Skin preparation:  Chlorhexidine with alcohol  Sedation:     Sedation type:  None  Anesthesia:     Anesthesia method:  Topical application and local infiltration    Topical anesthetic:  LET    Local anesthetic:  Lidocaine 1% w/o epi  Procedure type:     Complexity:  Simple  Procedure details:     Ultrasound guidance: no      Needle aspiration: no      Incision types:  Stab incision    Incision depth:  Subcutaneous    Drainage:  Purulent and serosanguinous    Drainage amount:  Scant    Wound treatment:  Wound left open    Packing materials:  None  Post-procedure details:     Procedure completion:  Tolerated               CHRISTO Blue  12/19/24 6216

## 2024-12-19 NOTE — ED TRIAGE NOTES
Patient arrives reporting cyst on her vulva present for the last 4 days, increasing in size. Was seen at urgent care and given antibiotics. States she has had cysts requiring drainage in the past. Denies any drainage from site, fever or chills

## 2024-12-19 NOTE — ED PROVIDER NOTES
HPI   Chief Complaint   Patient presents with    Cyst         History provided by:  Patient   used: No      34-year-old female presents ED for evaluation of Bartholin cyst.  He states this started 4 days ago and this will be her third 1.  She has had to have them drained in the past.  She states that it is painful and she has difficulty sitting down.  She has taken over-the-counter medications with improvement not relief of symptoms.  States that she was seen at urgent care and they gave her an antibiotic and sent her home.  Concern for STD, fevers or chills.  Denies any additional symptoms or complaints this time.    ROS is otherwise negative unless stated above.      Patient History   Past Medical History:   Diagnosis Date    Chlamydial infection, unspecified     Chlamydia    Dysuria 10/27/2014    Dysuria    Encounter for fitting and adjustment of spectacles and contact lenses     Contact lens/glasses fitting    Encounter for gynecological examination (general) (routine) without abnormal findings 05/15/2015    Well woman exam    Encounter for insertion of intrauterine contraceptive device     Encounter for insertion of mirena IUD    Encounter for insertion of intrauterine contraceptive device     Encounter for insertion of mirena IUD    Encounter for supervision of normal first pregnancy, unspecified trimester ([facility])     Primigravida    Other conditions influencing health status     History of pregnancy    Other problems related to lifestyle 07/12/2016    Other problems related to lifestyle    Other specified health status     Last menstrual period (LMP) > 10 days ago    Other specified postprocedural states     Required emergent intubation    Personal history of other diseases of the nervous system and sense organs     History of sleep disturbance    Personal history of other diseases of the respiratory system     Personal history of asthma    Personal history of other infectious and  parasitic diseases     History of sexually transmitted disease    Personal history of other mental and behavioral disorders     History of depression    Type A blood, Rh negative     Blood type A-    Urogenital trichomoniasis, unspecified     Trichs - trichomonas vaginalis infection     Past Surgical History:   Procedure Laterality Date    OTHER SURGICAL HISTORY  09/29/2014    Dental Surgery    OTHER SURGICAL HISTORY  10/12/2018    Hysteroscopy With Removal Of Impacted Foreign Body     No family history on file.  Social History     Tobacco Use    Smoking status: Never    Smokeless tobacco: Never   Substance Use Topics    Alcohol use: Never    Drug use: Yes     Types: Marijuana       Physical Exam   ED Triage Vitals [12/19/24 1317]   Temperature Heart Rate Respirations BP   36.9 °C (98.4 °F) 72 16 119/70      Pulse Ox Temp src Heart Rate Source Patient Position   98 % -- -- --      BP Location FiO2 (%)     -- --       Physical Exam  VS: As documented in the triage note and EMR flowsheet from this visit were reviewed.    GEN: Mild distress due to her pain, nontoxic, well-appearing, ambulates without difficulty  EYES: PERRLA  HEENT: Airway patent  CARD: RRR, nontender chest, no crepitus deformities, no JVD, no murmurs rubs or gallops ; No edema noted.  Positive pulses bilaterally throughout.  Capillary refill less than 3 seconds.  No abnormal redness, warmth, tenderness or swelling noted to bilateral lower extremities.  PULMONARY: Clear all lung fields. Moving air well, Nonlabored, no accessory muscle use, able to speak complete sentences  ABDOMEN: Abdomen soft, non-distended, no rebound, no guarding. Bowel sounds normal in all 4 quadrants. No tenderness to palpation.  : Right-sided Bartholin cyst noted, bedside RN as chaperone, no active drainage, positive tenderness to palpation  MUSK: Spine appears normal, range of motion is not limited, no muscle or joint tenderness. Strength 5 out of 5 equal bilaterally  throughout.  No step-offs, deformities or additional signs of trauma noted.  No spinal/midline tenderness to palpation  SKIN: Skin normal color for race, warm, dry and intact. No evidence of trauma. No rash noted.  NEURO: Alert and oriented x 3, speech is clear, no obvious deficits noted.     ED Course & MDM   Diagnoses as of 12/19/24 1437   Bartholin cyst                 No data recorded                                 Medical Decision Making      This is a 34-year-old female presents ED for evaluation of Bartholin cyst.  Patient states that she was seen in urgent care yesterday, they did not drain it but they gave her double strength Bactrim and mupirocin which she has been taking compliantly but despite this states that she feels like it is worsened.  Does appear in mild distress due to her pain.  Vaginal exam is performed with bedside RN as chaperone reveals right-sided arthrodesis with fluctuance tenderness to palpation but there is no active drainage at this time.  She is given medications for her symptoms.  I&D was performed successfully by myself, see procedure note for details, patient tolerated well.  She is educated continued care at home and to take already prescribed medications as directed.  She remained hemodynamically stable throughout ED course of care.  Findings and plan were discussed with patient and she was agreeable for plan and acknowledged understanding.    EKG Interpretation: N/A    Differential Diagnoses Considered: Labial cellulitis, Bartholin cyst    Chronic Medical Conditions Significantly Affecting Care: None    External Records Reviewed: I reviewed recent and relevant outside records including: PCP notes, prior discharge summary, previous radiologic studies, HIE    Independent Interpretation of Studies:  I independently interpreted: None    Escalation of Care:  Appropriate for patient management primary care provider/OB/GYN follow-up in the next week for reevaluation and long-term  care.  Return precautions discussed and patient verbalized understanding. All questions and concerns were addressed.    Social Determinants of Health Significantly Affecting Care:  None    Prescription Drug Consideration: Patient is already on antibiotics educated to take the entire course and use the ointment that she was additionally prescribed.  I prescribed the patient a couple oxycodone for severe pain and Tylenol p.o. after reviewing her OARRS score.  Additionally can use sitz bath's and warm compresses to the site.    Diagnostic testing considered: None indicated    Discussion of Management with Other Providers:   I discussed the patient/results with: none    *Please note that portions of this note may have been completed with a voice recognition program.  Efforts were made to edit the dictations but occasionally, words are mis-transcribed.      Procedure  Procedures     Ernestina Copeland, JUDITH-CNP  12/19/24 3942

## 2024-12-19 NOTE — Clinical Note
Brittany Chavez was seen and treated in our emergency department on 12/19/2024.  She may return to work on 12/23/2024.       If you have any questions or concerns, please don't hesitate to call.      Ernestina Copeland, APRN-CNP

## 2024-12-20 DIAGNOSIS — N90.7 VULVAR CYSTS: Primary | ICD-10-CM

## 2024-12-20 RX ORDER — CLINDAMYCIN PHOSPHATE, BENZOYL PEROXIDE 25; 10 MG/G; MG/G
GEL TOPICAL
Qty: 50 G | Refills: 1 | Status: SHIPPED | OUTPATIENT
Start: 2024-12-20

## 2024-12-23 RX ORDER — CLINDAMYCIN PHOSPHATE, BENZOYL PEROXIDE 25; 10 MG/G; MG/G
GEL TOPICAL
Qty: 50 G | OUTPATIENT
Start: 2024-12-23

## 2024-12-24 ENCOUNTER — OFFICE VISIT (OUTPATIENT)
Dept: URGENT CARE | Age: 34
End: 2024-12-24
Payer: COMMERCIAL

## 2024-12-24 VITALS
RESPIRATION RATE: 16 BRPM | OXYGEN SATURATION: 98 % | HEART RATE: 66 BPM | SYSTOLIC BLOOD PRESSURE: 107 MMHG | HEIGHT: 71 IN | DIASTOLIC BLOOD PRESSURE: 74 MMHG | WEIGHT: 156 LBS | TEMPERATURE: 98.1 F | BODY MASS INDEX: 21.84 KG/M2

## 2024-12-24 DIAGNOSIS — L73.2 VULVAL HIDRADENITIS SUPPURATIVA: Primary | ICD-10-CM

## 2024-12-24 DIAGNOSIS — Z20.2 ENCOUNTER FOR ASSESSMENT OF STD EXPOSURE: ICD-10-CM

## 2024-12-24 LAB
POC APPEARANCE, URINE: CLEAR
POC BILIRUBIN, URINE: NEGATIVE
POC BLOOD, URINE: NEGATIVE
POC COLOR, URINE: YELLOW
POC GLUCOSE, URINE: NEGATIVE MG/DL
POC KETONES, URINE: NEGATIVE MG/DL
POC LEUKOCYTES, URINE: NEGATIVE
POC NITRITE,URINE: NEGATIVE
POC PH, URINE: 6 PH
POC PROTEIN, URINE: NEGATIVE MG/DL
POC SPECIFIC GRAVITY, URINE: 1.02
POC UROBILINOGEN, URINE: 0.2 EU/DL
PREGNANCY TEST URINE, POC: NEGATIVE

## 2024-12-24 PROCEDURE — 99214 OFFICE O/P EST MOD 30 MIN: CPT

## 2024-12-24 PROCEDURE — 87070 CULTURE OTHR SPECIMN AEROBIC: CPT

## 2024-12-24 PROCEDURE — 81025 URINE PREGNANCY TEST: CPT

## 2024-12-24 PROCEDURE — 87075 CULTR BACTERIA EXCEPT BLOOD: CPT

## 2024-12-24 PROCEDURE — 81003 URINALYSIS AUTO W/O SCOPE: CPT

## 2024-12-24 PROCEDURE — 87086 URINE CULTURE/COLONY COUNT: CPT

## 2024-12-24 PROCEDURE — 1036F TOBACCO NON-USER: CPT

## 2024-12-24 PROCEDURE — 87591 N.GONORRHOEAE DNA AMP PROB: CPT

## 2024-12-24 PROCEDURE — 87205 SMEAR GRAM STAIN: CPT

## 2024-12-24 PROCEDURE — 3008F BODY MASS INDEX DOCD: CPT

## 2024-12-24 PROCEDURE — 87491 CHLMYD TRACH DNA AMP PROBE: CPT

## 2024-12-24 RX ORDER — DICLOXACILLIN SODIUM 250 MG/1
250 CAPSULE ORAL 4 TIMES DAILY
Qty: 40 CAPSULE | Refills: 0 | Status: SHIPPED | OUTPATIENT
Start: 2024-12-24 | End: 2025-01-03

## 2024-12-24 RX ORDER — LIDOCAINE 50 MG/G
OINTMENT TOPICAL 2 TIMES DAILY
Qty: 30 G | Refills: 0 | Status: SHIPPED | OUTPATIENT
Start: 2024-12-24

## 2024-12-24 NOTE — PROGRESS NOTES
Subjective   Patient ID: Brittany Chavez is a 34 y.o. female. They present today with a chief complaint of Exposure to STD and ingrows in pubic area.    History of Present Illness  Sexually Transmitted Disease Exposure: Patient presents for sexually transmitted disease check. Sexual history reviewed with the patient. STD exposure: denies knowledge of risky exposure.  Previous history of STD: none. Current symptoms include vaginal irritation, skin lesions in perineal region.  Contraception: none. Pt has large draining lesion on right labia that is causing her discomfort sitting. Pt has been seen at  and ED recently for same issue with an I&D performed at bedside and a course of oral and topical antibiotics.         History provided by:  Patient and medical records      Past Medical History  Allergies as of 12/24/2024 - Reviewed 12/24/2024   Allergen Reaction Noted    Naproxen Swelling, Rash, and Other 10/16/2019       (Not in a hospital admission)       Past Medical History:   Diagnosis Date    Chlamydial infection, unspecified     Chlamydia    Dysuria 10/27/2014    Dysuria    Encounter for fitting and adjustment of spectacles and contact lenses     Contact lens/glasses fitting    Encounter for gynecological examination (general) (routine) without abnormal findings 05/15/2015    Well woman exam    Encounter for insertion of intrauterine contraceptive device     Encounter for insertion of mirena IUD    Encounter for insertion of intrauterine contraceptive device     Encounter for insertion of mirena IUD    Encounter for supervision of normal first pregnancy, unspecified trimester ([facility])     Primigravida    Other conditions influencing health status     History of pregnancy    Other problems related to lifestyle 07/12/2016    Other problems related to lifestyle    Other specified health status     Last menstrual period (LMP) > 10 days ago    Other specified postprocedural states     Required emergent intubation     "Personal history of other diseases of the nervous system and sense organs     History of sleep disturbance    Personal history of other diseases of the respiratory system     Personal history of asthma    Personal history of other infectious and parasitic diseases     History of sexually transmitted disease    Personal history of other mental and behavioral disorders     History of depression    Type A blood, Rh negative     Blood type A-    Urogenital trichomoniasis, unspecified     Trichs - trichomonas vaginalis infection       Past Surgical History:   Procedure Laterality Date    OTHER SURGICAL HISTORY  09/29/2014    Dental Surgery    OTHER SURGICAL HISTORY  10/12/2018    Hysteroscopy With Removal Of Impacted Foreign Body        reports that she has never smoked. She has never used smokeless tobacco. She reports current drug use. Drug: Marijuana. She reports that she does not drink alcohol.    Review of Systems  Review of Systems   All other systems reviewed and are negative.  12-point ROS was reviewed and is negative, unless otherwise noted in HPI                                  Objective    Vitals:    12/24/24 0825   BP: 107/74   BP Location: Left arm   Patient Position: Sitting   BP Cuff Size: Adult   Pulse: 66   Resp: 16   Temp: 36.7 °C (98.1 °F)   TempSrc: Oral   SpO2: 98%   Weight: 70.8 kg (156 lb)   Height: 1.803 m (5' 11\")     Patient's last menstrual period was 12/16/2024 (exact date).    Physical Exam  Vitals and nursing note reviewed. Exam conducted with a chaperone present.   Constitutional:       Appearance: She is not ill-appearing or toxic-appearing.   Cardiovascular:      Rate and Rhythm: Normal rate and regular rhythm.      Heart sounds: Normal heart sounds.   Pulmonary:      Effort: Pulmonary effort is normal.      Breath sounds: Normal breath sounds.   Abdominal:      General: Abdomen is flat. Bowel sounds are normal.      Palpations: Abdomen is soft.      Tenderness: There is no right CVA " tenderness or left CVA tenderness.   Genitourinary:     Labia:         Right: Tenderness and lesion present.           Comments: Culture obtained. Pt declined I&D.  Skin:     General: Skin is warm and dry.      Capillary Refill: Capillary refill takes less than 2 seconds.      Findings: Lesion present.   Neurological:      Mental Status: She is alert and oriented to person, place, and time.   Psychiatric:         Behavior: Behavior normal.         Wound Care    Date/Time: 12/24/2024 8:49 AM    Performed by: CHRISTO Negron  Authorized by: CHRISTO Negron    Consent:     Consent obtained:  Verbal    Consent given by:  Patient    Risks, benefits, and alternatives were discussed: yes      Risks discussed:  Bleeding, infection, pain and incomplete drainage    Alternatives discussed:  No treatment, delayed treatment and alternative treatment  Sedation:     Sedation type:  None  Anesthesia:     Anesthesia method:  None  Procedure details:     Indications: skin infection      Wound location:  Anogenital    Anogenital location:  Vulva  Post-procedure details:     Procedure completion:  Tolerated well, no immediate complications      Point of Care Test & Imaging Results from this visit  Results for orders placed or performed in visit on 12/24/24   POCT UA Automated manually resulted   Result Value Ref Range    POC Color, Urine Yellow Straw, Yellow, Light-Yellow    POC Appearance, Urine Clear Clear    POC Glucose, Urine NEGATIVE NEGATIVE mg/dl    POC Bilirubin, Urine NEGATIVE NEGATIVE    POC Ketones, Urine NEGATIVE NEGATIVE mg/dl    POC Specific Gravity, Urine 1.020 1.005 - 1.035    POC Blood, Urine NEGATIVE NEGATIVE    POC PH, Urine 6.0 No Reference Range Established PH    POC Protein, Urine NEGATIVE NEGATIVE mg/dl    POC Urobilinogen, Urine 0.2 0.2, 1.0 EU/DL    Poc Nitrite, Urine NEGATIVE NEGATIVE    POC Leukocytes, Urine NEGATIVE NEGATIVE   POCT pregnancy, urine manually resulted   Result Value  Ref Range    Preg Test, Ur Negative Negative      No results found.    Diagnostic study results (if any) were reviewed by [facility].    Assessment/Plan   Allergies, medications, history, and pertinent labs/EKGs/Imaging reviewed by CHRISTO Negron.     Medical Decision Making  Risks, benefits, and alternatives of the medications and treatment plan prescribed today were discussed, and patient expressed understanding. Plan follow up as discussed or as needed if any worsening symptoms or change in condition. Reinforced red flags including (but not limited to): severe or worsening pain; difficulty swallowing; stiff neck; shortness of breath; coughing or vomiting blood; chest pain; and new or increased fever are indications to go to the Emergency Department.  At time of discharge patient was clinically well-appearing and HDS for outpatient management. The patient and/or family was educated regarding diagnosis, supportive care, OTC and Rx medications. The patient and/or family was given the opportunity to ask questions prior to discharge.  They verbalized understanding of my discussion of the plans for treatment, expected course, indications to return to  or seek further evaluation in ED, and the need for timely follow up as directed.   They were provided with a work/school excuse if requested. The after-visit summary was given to the patient and care instructions were reviewed with the patient. All questions were answered and the patient verbalized understanding of the plan of care for today.  Plan:  Recent visit notes reviewed  Meds as above  Increase clear fluids  Pcp follow up this week if not improving or worsening  ER visit anytime 24/7 for acute worsening or changing condition      Orders and Diagnoses  Diagnoses and all orders for this visit:  Vulval hidradenitis suppurativa  -     Tissue/Wound Culture/Smear  -     dicloxacillin (Dynapen) 250 mg capsule; Take 1 capsule (250 mg) by mouth 4  times a day for 10 days.  -     lidocaine (Xylocaine) 5 % ointment; Apply topically 2 times a day.  Encounter for assessment of STD exposure  -     POCT UA Automated manually resulted  -     POCT pregnancy, urine manually resulted  -     Urine Culture  -     Vaginitis Gram Stain For Bacterial Vaginosis + Yeast  -     Chlamydia trachomatis, Amplified  -     C. trachomatis / N. gonorrhoeae, Amplified  Other orders  -     Wound Care      Medical Admin Record      Patient disposition: Home    Electronically signed by [facility]  8:52 AM

## 2024-12-25 LAB
BACTERIA UR CULT: NO GROWTH
C TRACH RRNA SPEC QL NAA+PROBE: NEGATIVE
C TRACH RRNA SPEC QL NAA+PROBE: NEGATIVE
N GONORRHOEA DNA SPEC QL PROBE+SIG AMP: NEGATIVE

## 2024-12-26 ENCOUNTER — TELEPHONE (OUTPATIENT)
Dept: URGENT CARE | Age: 34
End: 2024-12-26

## 2024-12-26 LAB
BACTERIA SPEC CULT: NORMAL
GRAM STN SPEC: NORMAL
GRAM STN SPEC: NORMAL

## 2025-01-07 ENCOUNTER — APPOINTMENT (OUTPATIENT)
Dept: INTEGRATIVE MEDICINE | Facility: CLINIC | Age: 35
End: 2025-01-07
Payer: COMMERCIAL

## 2025-01-07 DIAGNOSIS — M79.9 POSTURAL STRAIN: ICD-10-CM

## 2025-01-07 DIAGNOSIS — M99.01 SEGMENTAL AND SOMATIC DYSFUNCTION OF CERVICAL REGION: Primary | ICD-10-CM

## 2025-01-07 DIAGNOSIS — M99.00 SEGMENTAL AND SOMATIC DYSFUNCTION OF HEAD REGION: ICD-10-CM

## 2025-01-07 DIAGNOSIS — M99.02 SEGMENTAL AND SOMATIC DYSFUNCTION OF THORACIC REGION: ICD-10-CM

## 2025-01-07 DIAGNOSIS — M79.10 MYALGIA: ICD-10-CM

## 2025-01-07 DIAGNOSIS — M54.2 CERVICALGIA: ICD-10-CM

## 2025-01-07 PROCEDURE — 98941 CHIROPRACT MANJ 3-4 REGIONS: CPT | Performed by: CHIROPRACTOR

## 2025-01-07 NOTE — PROGRESS NOTES
Subjective   Patient ID: Brittany Chavez is a 34 y.o. female who presents January 7, 2025 for neck pain.    (1/15) VPCY    Today, the patient rates their degree of pain as a 5 out of 10 on the numeric pain rating scale.     Brittany returns for continued treatment of neck pain. Patient states that she had relief in symptoms after last visit that has lasted until today. She states that she had mild soreness for the day of her last treatment. She states that she has had better range of motion since last visit as well. Denies any associated symptoms or change in medical history since last visit.   ____________________________________________________  Initial Exam:  Brittany presents for evaluation and treatment of neck pain. Patients states that symptoms began in June 2024 upon waking. Patient denies any injury/trauma/accident or previous neck symptoms. She states that pain starts at the base of the skull and goes down to the top of the shoulders, bilaterally. She states that she has had an increase in headaches since neck pain began and she gets 2-3 headaches a month currently. She states that lying down, trying to sleep, and moving her head while driving increase symptoms while nothing has helped relieve symptoms. Patient states that she has been in PT for 2 months and has not noted any relief in symptoms from treatment. She states that urgent care prescribed NSAIDs and muscle relaxants, which she states did not decrease symptoms. Patient denies any difficulty speaking/swallowing, vision changes, bowel/bladder issues, chest pain/shortness of breath, numbness/tingling. Patient is new to chiropractic care.            Objective   Physical Exam  Constitutional:       General: She is not in acute distress.     Appearance: Normal appearance.       HENT:      Head: Normocephalic and atraumatic.   Musculoskeletal:      Cervical back: Tenderness present. Pain with movement present. Decreased range of motion.   Neurological:       General: No focal deficit present.      Mental Status: She is alert and oriented to person, place, and time.      Cranial Nerves: No dysarthria or facial asymmetry.      Sensory: Sensation is intact.      Motor: Motor function is intact.      Coordination: Coordination is intact.      Gait: Gait is intact.   Psychiatric:         Attention and Perception: Attention normal.         Mood and Affect: Mood normal.         Speech: Speech normal.         Behavior: Behavior is cooperative.         Palpation of the following regions revealed:  Cervical: Suboccipitals bilateral, hypertonicity and tenderness.  Upper trapezius bilateral, hypertonicity and tenderness.  Levator scapulae bilateral, hypertonicity and tenderness.  Cervical paraspinals bilateral, hypertonicity and tenderness.  Thoracic: Thoracic paraspinals bilateral, hypertonicity and tenderness.    Segmental Joint(s): Segmental joint dysfunction was assessed with motion palpation and is identified in the following areas:  Cervical : C0 C1 C2 C4 C6 C7  Thoracic : T1, T2., and T6    Assessment/Plan   Today's Treatment Included: Spinal manipulation to the following regions of segmental dysfunction identified on examination, using age-appropriate and injury specific force. Segmental Joint(s) Cervical : C0 C1 C2 C4 C6 C7 Segmental Joint(s) Thoracic : T1, T2., and T6   Chiropractic manipulation treatment techniques utilized: Diversified CMT and Cervical Manual Traction  Soft tissue mobilization techniques utilized during treatment: Ischemic compression    Soft-tissue mobilization was performed in the following areas:  Suboccipital bilateral, Cervical paraspinal mm. bilateral, Upper Trapezius bilateral, and Levator Scap. bilateral  Thoracic Paraspinal mm. bilateral    Recommended follow-up in 1 week(s).     The patient tolerated today's treatment with little or no additional discomfort and was instructed to contact the office for questions or concerns.

## 2025-01-10 ENCOUNTER — TELEPHONE (OUTPATIENT)
Dept: OBSTETRICS AND GYNECOLOGY | Facility: CLINIC | Age: 35
End: 2025-01-10

## 2025-01-14 ENCOUNTER — APPOINTMENT (OUTPATIENT)
Dept: INTEGRATIVE MEDICINE | Facility: CLINIC | Age: 35
End: 2025-01-14
Payer: COMMERCIAL

## 2025-01-14 DIAGNOSIS — M79.10 MYALGIA: ICD-10-CM

## 2025-01-14 DIAGNOSIS — M54.2 CERVICALGIA: ICD-10-CM

## 2025-01-14 DIAGNOSIS — M79.9 POSTURAL STRAIN: ICD-10-CM

## 2025-01-14 DIAGNOSIS — M99.01 SEGMENTAL AND SOMATIC DYSFUNCTION OF CERVICAL REGION: Primary | ICD-10-CM

## 2025-01-14 DIAGNOSIS — M99.02 SEGMENTAL AND SOMATIC DYSFUNCTION OF THORACIC REGION: ICD-10-CM

## 2025-01-14 DIAGNOSIS — M99.00 SEGMENTAL AND SOMATIC DYSFUNCTION OF HEAD REGION: ICD-10-CM

## 2025-01-14 PROCEDURE — 98941 CHIROPRACT MANJ 3-4 REGIONS: CPT | Performed by: CHIROPRACTOR

## 2025-01-14 NOTE — PROGRESS NOTES
Subjective   Patient ID: Brittany Chavez is a 35 y.o. female who presents January 14, 2025 for neck pain.    (2/15) VPCY    Today, the patient rates their degree of pain as a 3 out of 10 on the numeric pain rating scale.     Brittany returns for continued treatment of neck pain. Patient states that she is well today. She states that she continues to note improvement in symptoms from treatment. She states that she has neck stiffness and feels like her neck still needs to be cracked. Denies any associated symptoms or change in medical history since last visit.   ____________________________________________________  Initial Exam:  Brittany presents for evaluation and treatment of neck pain. Patients states that symptoms began in June 2024 upon waking. Patient denies any injury/trauma/accident or previous neck symptoms. She states that pain starts at the base of the skull and goes down to the top of the shoulders, bilaterally. She states that she has had an increase in headaches since neck pain began and she gets 2-3 headaches a month currently. She states that lying down, trying to sleep, and moving her head while driving increase symptoms while nothing has helped relieve symptoms. Patient states that she has been in PT for 2 months and has not noted any relief in symptoms from treatment. She states that urgent care prescribed NSAIDs and muscle relaxants, which she states did not decrease symptoms. Patient denies any difficulty speaking/swallowing, vision changes, bowel/bladder issues, chest pain/shortness of breath, numbness/tingling. Patient is new to chiropractic care.            Objective   Physical Exam  Constitutional:       General: She is not in acute distress.     Appearance: Normal appearance.       HENT:      Head: Normocephalic and atraumatic.   Musculoskeletal:      Cervical back: Tenderness present. Pain with movement present. Decreased range of motion.   Neurological:      General: No focal deficit present.       Mental Status: She is alert and oriented to person, place, and time.      Cranial Nerves: No dysarthria or facial asymmetry.      Sensory: Sensation is intact.      Motor: Motor function is intact.      Coordination: Coordination is intact.      Gait: Gait is intact.   Psychiatric:         Attention and Perception: Attention normal.         Mood and Affect: Mood normal.         Speech: Speech normal.         Behavior: Behavior is cooperative.         Palpation of the following regions revealed:  Cervical: Suboccipitals bilateral, hypertonicity and tenderness.  Upper trapezius bilateral, hypertonicity and tenderness.  Levator scapulae bilateral, hypertonicity and tenderness.  Cervical paraspinals bilateral, hypertonicity and tenderness.  Thoracic: Thoracic paraspinals bilateral, hypertonicity and tenderness.    Segmental Joint(s): Segmental joint dysfunction was assessed with motion palpation and is identified in the following areas:  Cervical : C0 C1 C2 C4 C6 C7  Thoracic : T1, T2., and T6    Assessment/Plan   Today's Treatment Included: Spinal manipulation to the following regions of segmental dysfunction identified on examination, using age-appropriate and injury specific force. Segmental Joint(s) Cervical : C0 C1 C2 C4 C6 C7 Segmental Joint(s) Thoracic : T1, T2., and T6   Chiropractic manipulation treatment techniques utilized: Diversified CMT and Cervical Manual Traction  Soft tissue mobilization techniques utilized during treatment: Ischemic compression    Soft-tissue mobilization was performed in the following areas:  Suboccipital bilateral, Cervical paraspinal mm. bilateral, Upper Trapezius bilateral, and Levator Scap. bilateral  Thoracic Paraspinal mm. bilateral    Recommended follow-up in 1 week(s).     The patient tolerated today's treatment with little or no additional discomfort and was instructed to contact the office for questions or concerns.

## 2025-01-21 ENCOUNTER — APPOINTMENT (OUTPATIENT)
Dept: INTEGRATIVE MEDICINE | Facility: CLINIC | Age: 35
End: 2025-01-21
Payer: COMMERCIAL

## 2025-01-21 DIAGNOSIS — M99.00 SEGMENTAL AND SOMATIC DYSFUNCTION OF HEAD REGION: ICD-10-CM

## 2025-01-21 DIAGNOSIS — M79.10 MYALGIA: ICD-10-CM

## 2025-01-21 DIAGNOSIS — M99.02 SEGMENTAL AND SOMATIC DYSFUNCTION OF THORACIC REGION: ICD-10-CM

## 2025-01-21 DIAGNOSIS — M79.9 POSTURAL STRAIN: ICD-10-CM

## 2025-01-21 DIAGNOSIS — M99.01 SEGMENTAL AND SOMATIC DYSFUNCTION OF CERVICAL REGION: Primary | ICD-10-CM

## 2025-01-21 DIAGNOSIS — M54.2 CERVICALGIA: ICD-10-CM

## 2025-01-21 PROCEDURE — 98941 CHIROPRACT MANJ 3-4 REGIONS: CPT | Performed by: CHIROPRACTOR

## 2025-01-21 NOTE — PROGRESS NOTES
Subjective   Patient ID: Brittany Chavez is a 35 y.o. female who presents January 21, 2025 for neck pain.    (3/15) VPCY    Today, the patient rates their degree of pain as a 2 out of 10 on the numeric pain rating scale.     Brittany returns for continued treatment of neck pain. Patient states that she is doing well. She states that she has had less neck discomfort this past week. She states that she has been having more movement in her neck and has had audible releases when stretching. Denies any associated symptoms or change in medical history since last visit.   ____________________________________________________  Initial Exam:  Brittany presents for evaluation and treatment of neck pain. Patients states that symptoms began in June 2024 upon waking. Patient denies any injury/trauma/accident or previous neck symptoms. She states that pain starts at the base of the skull and goes down to the top of the shoulders, bilaterally. She states that she has had an increase in headaches since neck pain began and she gets 2-3 headaches a month currently. She states that lying down, trying to sleep, and moving her head while driving increase symptoms while nothing has helped relieve symptoms. Patient states that she has been in PT for 2 months and has not noted any relief in symptoms from treatment. She states that urgent care prescribed NSAIDs and muscle relaxants, which she states did not decrease symptoms. Patient denies any difficulty speaking/swallowing, vision changes, bowel/bladder issues, chest pain/shortness of breath, numbness/tingling. Patient is new to chiropractic care.            Objective   Physical Exam  Constitutional:       General: She is not in acute distress.     Appearance: Normal appearance.       HENT:      Head: Normocephalic and atraumatic.   Musculoskeletal:      Cervical back: Tenderness present. Pain with movement present. Decreased range of motion.   Neurological:      General: No focal deficit  present.      Mental Status: She is alert and oriented to person, place, and time.      Cranial Nerves: No dysarthria or facial asymmetry.      Sensory: Sensation is intact.      Motor: Motor function is intact.      Coordination: Coordination is intact.      Gait: Gait is intact.   Psychiatric:         Attention and Perception: Attention normal.         Mood and Affect: Mood normal.         Speech: Speech normal.         Behavior: Behavior is cooperative.         Palpation of the following regions revealed:  Cervical: Suboccipitals bilateral, hypertonicity and tenderness.  Upper trapezius bilateral, hypertonicity and tenderness.  Levator scapulae bilateral, hypertonicity and tenderness.  Cervical paraspinals bilateral, hypertonicity and tenderness.  Thoracic: Thoracic paraspinals bilateral, hypertonicity and tenderness.    Segmental Joint(s): Segmental joint dysfunction was assessed with motion palpation and is identified in the following areas:  Cervical : C0 C1 C2 C4 C6 C7  Thoracic : T1, T2., and T6    Assessment/Plan   Today's Treatment Included: Spinal manipulation to the following regions of segmental dysfunction identified on examination, using age-appropriate and injury specific force. Segmental Joint(s) Cervical : C0 C1 C2 C4 C6 C7 Segmental Joint(s) Thoracic : T1, T2., and T6   Chiropractic manipulation treatment techniques utilized: Diversified CMT and Cervical Manual Traction  Soft tissue mobilization techniques utilized during treatment: Ischemic compression    Soft-tissue mobilization was performed in the following areas:  Suboccipital bilateral, Cervical paraspinal mm. bilateral, Upper Trapezius bilateral, and Levator Scap. bilateral  Thoracic Paraspinal mm. bilateral    Recommended follow-up in 1 week(s).     The patient tolerated today's treatment with little or no additional discomfort and was instructed to contact the office for questions or concerns.

## 2025-01-28 ENCOUNTER — APPOINTMENT (OUTPATIENT)
Dept: INTEGRATIVE MEDICINE | Facility: CLINIC | Age: 35
End: 2025-01-28
Payer: COMMERCIAL

## 2025-01-28 DIAGNOSIS — M99.00 SEGMENTAL AND SOMATIC DYSFUNCTION OF HEAD REGION: ICD-10-CM

## 2025-01-28 DIAGNOSIS — M79.9 POSTURAL STRAIN: ICD-10-CM

## 2025-01-28 DIAGNOSIS — M79.10 MYALGIA: ICD-10-CM

## 2025-01-28 DIAGNOSIS — M99.02 SEGMENTAL AND SOMATIC DYSFUNCTION OF THORACIC REGION: ICD-10-CM

## 2025-01-28 DIAGNOSIS — M99.01 SEGMENTAL AND SOMATIC DYSFUNCTION OF CERVICAL REGION: Primary | ICD-10-CM

## 2025-01-28 DIAGNOSIS — M54.2 CERVICALGIA: ICD-10-CM

## 2025-01-28 PROCEDURE — 98941 CHIROPRACT MANJ 3-4 REGIONS: CPT | Performed by: CHIROPRACTOR

## 2025-01-28 NOTE — PROGRESS NOTES
Subjective   Patient ID: Brittany Chavez is a 35 y.o. female who presents January 28, 2025 for neck pain.    (4/15) VPCY    Today, the patient rates their degree of pain as a 1 out of 10 on the numeric pain rating scale.     Brittany returns for continued treatment of neck pain. Patient states that she is well today. She states that she has noted less discomfort and better movement in her neck since treatment began. She states that she has been able to get releases while stretching and notes improvement from that as well. Denies any associated symptoms or change in medical history since last visit.   ____________________________________________________  Initial Exam:  Brittany presents for evaluation and treatment of neck pain. Patients states that symptoms began in June 2024 upon waking. Patient denies any injury/trauma/accident or previous neck symptoms. She states that pain starts at the base of the skull and goes down to the top of the shoulders, bilaterally. She states that she has had an increase in headaches since neck pain began and she gets 2-3 headaches a month currently. She states that lying down, trying to sleep, and moving her head while driving increase symptoms while nothing has helped relieve symptoms. Patient states that she has been in PT for 2 months and has not noted any relief in symptoms from treatment. She states that urgent care prescribed NSAIDs and muscle relaxants, which she states did not decrease symptoms. Patient denies any difficulty speaking/swallowing, vision changes, bowel/bladder issues, chest pain/shortness of breath, numbness/tingling. Patient is new to chiropractic care.            Objective   Physical Exam  Constitutional:       General: She is not in acute distress.     Appearance: Normal appearance.       HENT:      Head: Normocephalic and atraumatic.   Musculoskeletal:      Cervical back: Tenderness present. Pain with movement present. Decreased range of motion.   Neurological:       General: No focal deficit present.      Mental Status: She is alert and oriented to person, place, and time.      Cranial Nerves: No dysarthria or facial asymmetry.      Sensory: Sensation is intact.      Motor: Motor function is intact.      Coordination: Coordination is intact.      Gait: Gait is intact.   Psychiatric:         Attention and Perception: Attention normal.         Mood and Affect: Mood normal.         Speech: Speech normal.         Behavior: Behavior is cooperative.         Palpation of the following regions revealed:  Cervical: Suboccipitals bilateral, hypertonicity and tenderness.  Upper trapezius bilateral, hypertonicity and tenderness.  Levator scapulae bilateral, hypertonicity and tenderness.  Cervical paraspinals bilateral, hypertonicity and tenderness.  Thoracic: Thoracic paraspinals bilateral, hypertonicity and tenderness.    Segmental Joint(s): Segmental joint dysfunction was assessed with motion palpation and is identified in the following areas:  Cervical : C0 C1 C2 C4 C6 C7  Thoracic : T1, T2., and T6    Assessment/Plan   Today's Treatment Included: Spinal manipulation to the following regions of segmental dysfunction identified on examination, using age-appropriate and injury specific force. Segmental Joint(s) Cervical : C0 C1 C2 C4 C6 C7 Segmental Joint(s) Thoracic : T1, T2., and T6   Chiropractic manipulation treatment techniques utilized: Diversified CMT and Cervical Manual Traction  Soft tissue mobilization techniques utilized during treatment: Ischemic compression    Soft-tissue mobilization was performed in the following areas:  Suboccipital bilateral, Cervical paraspinal mm. bilateral, Upper Trapezius bilateral, and Levator Scap. bilateral  Thoracic Paraspinal mm. bilateral    Recommended follow-up in 1 week(s).     The patient tolerated today's treatment with little or no additional discomfort and was instructed to contact the office for questions or concerns.

## 2025-02-05 ENCOUNTER — DOCUMENTATION (OUTPATIENT)
Dept: PHYSICAL THERAPY | Facility: CLINIC | Age: 35
End: 2025-02-05
Payer: COMMERCIAL

## 2025-02-05 NOTE — PROGRESS NOTES
Physical Therapy    Discharge Summary    Name: Brittany Chavez  MRN: [id number]  : 1990  Date: 25    Discharge Summary: PT    Discharge Information: Date of discharge 25    Therapy Summary: Discharge patient from 24 to 24.    Discharge Status: Discharged     Rehab Discharge Reason: Failed to schedule and/or keep follow-up appointment(s)

## 2025-02-18 ENCOUNTER — APPOINTMENT (OUTPATIENT)
Dept: INTEGRATIVE MEDICINE | Facility: CLINIC | Age: 35
End: 2025-02-18
Payer: COMMERCIAL

## 2025-02-25 ENCOUNTER — APPOINTMENT (OUTPATIENT)
Dept: INTEGRATIVE MEDICINE | Facility: CLINIC | Age: 35
End: 2025-02-25
Payer: COMMERCIAL

## 2025-03-18 ENCOUNTER — APPOINTMENT (OUTPATIENT)
Dept: INTEGRATIVE MEDICINE | Facility: CLINIC | Age: 35
End: 2025-03-18
Payer: COMMERCIAL

## 2025-03-18 DIAGNOSIS — M79.10 MYALGIA: ICD-10-CM

## 2025-03-18 DIAGNOSIS — M99.02 SEGMENTAL AND SOMATIC DYSFUNCTION OF THORACIC REGION: ICD-10-CM

## 2025-03-18 DIAGNOSIS — M99.01 SEGMENTAL AND SOMATIC DYSFUNCTION OF CERVICAL REGION: Primary | ICD-10-CM

## 2025-03-18 DIAGNOSIS — M79.9 POSTURAL STRAIN: ICD-10-CM

## 2025-03-18 DIAGNOSIS — M99.00 SEGMENTAL AND SOMATIC DYSFUNCTION OF HEAD REGION: ICD-10-CM

## 2025-03-18 DIAGNOSIS — M54.2 CERVICALGIA: ICD-10-CM

## 2025-03-18 PROCEDURE — 98941 CHIROPRACT MANJ 3-4 REGIONS: CPT | Performed by: CHIROPRACTOR

## 2025-03-18 NOTE — PROGRESS NOTES
Subjective   Patient ID: Brittany Chavez is a 35 y.o. female who presents March 18, 2025 for neck pain.    (5/15) VPCY    Today, the patient rates their degree of pain as a 1 out of 10 on the numeric pain rating scale.     Brittany returns for continued treatment of neck pain. Patient states that she has been doing well. She states that she has had mild flare ups of neck pain that have been short lived. She states that she has been able to adjust her own neck occasionally and has noted benefit from that. She states that overall she is feeling much better than when she first presented for care. Denies any associated symptoms or change in medical history since last visit.   ____________________________________________________  Initial Exam:  Brittany presents for evaluation and treatment of neck pain. Patients states that symptoms began in June 2024 upon waking. Patient denies any injury/trauma/accident or previous neck symptoms. She states that pain starts at the base of the skull and goes down to the top of the shoulders, bilaterally. She states that she has had an increase in headaches since neck pain began and she gets 2-3 headaches a month currently. She states that lying down, trying to sleep, and moving her head while driving increase symptoms while nothing has helped relieve symptoms. Patient states that she has been in PT for 2 months and has not noted any relief in symptoms from treatment. She states that urgent care prescribed NSAIDs and muscle relaxants, which she states did not decrease symptoms. Patient denies any difficulty speaking/swallowing, vision changes, bowel/bladder issues, chest pain/shortness of breath, numbness/tingling. Patient is new to chiropractic care.            Objective   Physical Exam  Constitutional:       General: She is not in acute distress.     Appearance: Normal appearance.       HENT:      Head: Normocephalic and atraumatic.   Musculoskeletal:      Cervical back: Tenderness  present. Pain with movement present. Decreased range of motion.   Neurological:      General: No focal deficit present.      Mental Status: She is alert and oriented to person, place, and time.      Cranial Nerves: No dysarthria or facial asymmetry.      Sensory: Sensation is intact.      Motor: Motor function is intact.      Coordination: Coordination is intact.      Gait: Gait is intact.   Psychiatric:         Attention and Perception: Attention normal.         Mood and Affect: Mood normal.         Speech: Speech normal.         Behavior: Behavior is cooperative.         Palpation of the following regions revealed:  Cervical: Suboccipitals bilateral, hypertonicity and tenderness.  Upper trapezius bilateral, hypertonicity and tenderness.  Levator scapulae bilateral, hypertonicity and tenderness.  Cervical paraspinals bilateral, hypertonicity and tenderness.  Thoracic: Thoracic paraspinals bilateral, hypertonicity and tenderness.    Segmental Joint(s): Segmental joint dysfunction was assessed with motion palpation and is identified in the following areas:  Cervical : C0 C1 C2 C4 C6 C7  Thoracic : T1, T2., and T6    Assessment/Plan   Today's Treatment Included: Spinal manipulation to the following regions of segmental dysfunction identified on examination, using age-appropriate and injury specific force. Segmental Joint(s) Cervical : C0 C1 C2 C4 C6 C7 Segmental Joint(s) Thoracic : T1, T2., and T6   Chiropractic manipulation treatment techniques utilized: Diversified CMT and Cervical Manual Traction  Soft tissue mobilization techniques utilized during treatment: Ischemic compression    Soft-tissue mobilization was performed in the following areas:  Suboccipital bilateral, Cervical paraspinal mm. bilateral, Upper Trapezius bilateral, and Levator Scap. bilateral  Thoracic Paraspinal mm. bilateral    Recommended follow-up in 1 month(s).     The patient tolerated today's treatment with little or no additional discomfort  and was instructed to contact the office for questions or concerns.

## 2025-04-15 ENCOUNTER — APPOINTMENT (OUTPATIENT)
Dept: INTEGRATIVE MEDICINE | Facility: CLINIC | Age: 35
End: 2025-04-15
Payer: COMMERCIAL

## 2025-04-15 DIAGNOSIS — M79.9 POSTURAL STRAIN: ICD-10-CM

## 2025-04-15 DIAGNOSIS — M99.00 SEGMENTAL AND SOMATIC DYSFUNCTION OF HEAD REGION: ICD-10-CM

## 2025-04-15 DIAGNOSIS — M99.01 SEGMENTAL AND SOMATIC DYSFUNCTION OF CERVICAL REGION: Primary | ICD-10-CM

## 2025-04-15 DIAGNOSIS — M79.10 MYALGIA: ICD-10-CM

## 2025-04-15 DIAGNOSIS — M54.2 CERVICALGIA: ICD-10-CM

## 2025-04-15 DIAGNOSIS — M99.02 SEGMENTAL AND SOMATIC DYSFUNCTION OF THORACIC REGION: ICD-10-CM

## 2025-04-15 PROCEDURE — 98941 CHIROPRACT MANJ 3-4 REGIONS: CPT | Performed by: CHIROPRACTOR

## 2025-04-15 NOTE — PROGRESS NOTES
Subjective   Patient ID: Brittany Chavez is a 35 y.o. female who presents April 15, 2025 for neck pain.    (6/15) VPCY    Today, the patient rates their degree of pain as a 2 out of 10 on the numeric pain rating scale.     Brittany returns for continued treatment of neck pain. Patient states that she is well. She states that she has had occasional mild flare ups of neck symptoms since last visit. She states that symptoms started to increase last week and have remained until today. Denies any associated symptoms or change in medical history since last visit.   ____________________________________________________  Initial Exam:  Brittany presents for evaluation and treatment of neck pain. Patients states that symptoms began in June 2024 upon waking. Patient denies any injury/trauma/accident or previous neck symptoms. She states that pain starts at the base of the skull and goes down to the top of the shoulders, bilaterally. She states that she has had an increase in headaches since neck pain began and she gets 2-3 headaches a month currently. She states that lying down, trying to sleep, and moving her head while driving increase symptoms while nothing has helped relieve symptoms. Patient states that she has been in PT for 2 months and has not noted any relief in symptoms from treatment. She states that urgent care prescribed NSAIDs and muscle relaxants, which she states did not decrease symptoms. Patient denies any difficulty speaking/swallowing, vision changes, bowel/bladder issues, chest pain/shortness of breath, numbness/tingling. Patient is new to chiropractic care.            Objective   Physical Exam  Constitutional:       General: She is not in acute distress.     Appearance: Normal appearance.       HENT:      Head: Normocephalic and atraumatic.   Musculoskeletal:      Cervical back: Tenderness present. Pain with movement present. Decreased range of motion.   Neurological:      General: No focal deficit present.       Mental Status: She is alert and oriented to person, place, and time.      Cranial Nerves: No dysarthria or facial asymmetry.      Sensory: Sensation is intact.      Motor: Motor function is intact.      Coordination: Coordination is intact.      Gait: Gait is intact.   Psychiatric:         Attention and Perception: Attention normal.         Mood and Affect: Mood normal.         Speech: Speech normal.         Behavior: Behavior is cooperative.         Palpation of the following regions revealed:  Cervical: Suboccipitals bilateral, hypertonicity and tenderness.  Upper trapezius bilateral, hypertonicity and tenderness.  Levator scapulae bilateral, hypertonicity and tenderness.  Cervical paraspinals bilateral, hypertonicity and tenderness.  Thoracic: Thoracic paraspinals bilateral, hypertonicity and tenderness.    Segmental Joint(s): Segmental joint dysfunction was assessed with motion palpation and is identified in the following areas:  Cervical : C0 C1 C2 C4 C6 C7  Thoracic : T1, T2., and T6    Assessment/Plan   Today's Treatment Included: Spinal manipulation to the following regions of segmental dysfunction identified on examination, using age-appropriate and injury specific force. Segmental Joint(s) Cervical : C0 C1 C2 C4 C6 C7 Segmental Joint(s) Thoracic : T1, T2., and T6   Chiropractic manipulation treatment techniques utilized: Diversified CMT and Cervical Manual Traction  Soft tissue mobilization techniques utilized during treatment: Ischemic compression    Soft-tissue mobilization was performed in the following areas:  Suboccipital bilateral, Cervical paraspinal mm. bilateral, Upper Trapezius bilateral, and Levator Scap. bilateral  Thoracic Paraspinal mm. bilateral    Recommended follow-up in 1 month(s).     The patient tolerated today's treatment with little or no additional discomfort and was instructed to contact the office for questions or concerns.

## 2025-04-18 ENCOUNTER — APPOINTMENT (OUTPATIENT)
Dept: ORTHOPEDIC SURGERY | Facility: CLINIC | Age: 35
End: 2025-04-18
Payer: COMMERCIAL

## 2025-04-18 DIAGNOSIS — M25.511 RIGHT SHOULDER PAIN, UNSPECIFIED CHRONICITY: ICD-10-CM

## 2025-04-22 ENCOUNTER — OFFICE VISIT (OUTPATIENT)
Dept: URGENT CARE | Age: 35
End: 2025-04-22
Payer: COMMERCIAL

## 2025-04-22 VITALS
BODY MASS INDEX: 29.44 KG/M2 | TEMPERATURE: 98.2 F | WEIGHT: 160 LBS | OXYGEN SATURATION: 98 % | HEART RATE: 78 BPM | DIASTOLIC BLOOD PRESSURE: 82 MMHG | SYSTOLIC BLOOD PRESSURE: 120 MMHG | RESPIRATION RATE: 18 BRPM | HEIGHT: 62 IN

## 2025-04-22 DIAGNOSIS — R05.9 COUGH, UNSPECIFIED TYPE: ICD-10-CM

## 2025-04-22 DIAGNOSIS — B34.8 RHINOVIRUS: ICD-10-CM

## 2025-04-22 DIAGNOSIS — J06.9 VIRAL URI: Primary | ICD-10-CM

## 2025-04-22 DIAGNOSIS — J45.901 EXACERBATION OF ASTHMA, UNSPECIFIED ASTHMA SEVERITY, UNSPECIFIED WHETHER PERSISTENT (HHS-HCC): ICD-10-CM

## 2025-04-22 LAB
POC CORONAVIRUS SARS-COV-2 PCR: NEGATIVE
POC HUMAN RHINOVIRUS PCR: POSITIVE
POC INFLUENZA A VIRUS PCR: NEGATIVE
POC INFLUENZA B VIRUS PCR: NEGATIVE
POC RAPID STREP: NEGATIVE
POC RESPIRATORY SYNCYTIAL VIRUS PCR: NEGATIVE

## 2025-04-22 PROCEDURE — 99214 OFFICE O/P EST MOD 30 MIN: CPT | Performed by: PHYSICIAN ASSISTANT

## 2025-04-22 PROCEDURE — 87880 STREP A ASSAY W/OPTIC: CPT | Performed by: PHYSICIAN ASSISTANT

## 2025-04-22 PROCEDURE — 1036F TOBACCO NON-USER: CPT | Performed by: PHYSICIAN ASSISTANT

## 2025-04-22 PROCEDURE — 3008F BODY MASS INDEX DOCD: CPT | Performed by: PHYSICIAN ASSISTANT

## 2025-04-22 PROCEDURE — 87631 RESP VIRUS 3-5 TARGETS: CPT | Performed by: PHYSICIAN ASSISTANT

## 2025-04-22 RX ORDER — PREDNISONE 50 MG/1
50 TABLET ORAL DAILY
Qty: 5 TABLET | Refills: 0 | Status: SHIPPED | OUTPATIENT
Start: 2025-04-22 | End: 2025-04-27

## 2025-04-22 RX ORDER — BROMPHENIRAMINE MALEATE, PSEUDOEPHEDRINE HYDROCHLORIDE, AND DEXTROMETHORPHAN HYDROBROMIDE 2; 30; 10 MG/5ML; MG/5ML; MG/5ML
5 SYRUP ORAL EVERY 6 HOURS
Qty: 100 ML | Refills: 0 | Status: SHIPPED | OUTPATIENT
Start: 2025-04-22 | End: 2025-04-27

## 2025-04-22 RX ORDER — ALBUTEROL SULFATE 90 UG/1
2 INHALANT RESPIRATORY (INHALATION) EVERY 6 HOURS PRN
Qty: 6.7 G | Refills: 0 | Status: SHIPPED | OUTPATIENT
Start: 2025-04-22 | End: 2026-04-22

## 2025-04-22 ASSESSMENT — PATIENT HEALTH QUESTIONNAIRE - PHQ9
2. FEELING DOWN, DEPRESSED OR HOPELESS: NOT AT ALL
SUM OF ALL RESPONSES TO PHQ9 QUESTIONS 1 AND 2: 0
1. LITTLE INTEREST OR PLEASURE IN DOING THINGS: NOT AT ALL

## 2025-04-22 ASSESSMENT — ENCOUNTER SYMPTOMS
SHORTNESS OF BREATH: 1
MYALGIAS: 1
COUGH: 1
SORE THROAT: 1
ARTHRALGIAS: 1
FEVER: 0
CHEST TIGHTNESS: 1

## 2025-04-22 ASSESSMENT — PAIN SCALES - GENERAL: PAINLEVEL_OUTOF10: 9

## 2025-04-22 NOTE — PROGRESS NOTES
"Subjective   Patient ID: Brittany Chavez is a 35 y.o. female. They present today with a chief complaint of Sore Throat and Cough (Deep in chest x 2/ headache, ).    History of Present Illness  Patient is a 35 year old female presenting for evaluation of a cough and sore throat. Symptoms started about 2 days ago. Reports associated headache, body aches. Tried theraflu without relief of symptoms. History of asthma but has not had issues recently. No inhaler at home. Reports feeling chest tightness and short of breath at times with this illness. Denies fever.       History provided by:  Patient   used: No    Sore Throat   Associated symptoms include congestion, coughing and shortness of breath.   Cough  Associated symptoms include myalgias, a sore throat and shortness of breath. Pertinent negatives include no fever.       Past Medical History  Allergies as of 04/22/2025 - Reviewed 04/22/2025   Allergen Reaction Noted    Naproxen Swelling, Rash, and Other 10/16/2019       Prescriptions Prior to Admission[1]     Medical History[2]    Surgical History[3]     reports that she has never smoked. She has never used smokeless tobacco. She reports current drug use. Drug: Marijuana. She reports that she does not drink alcohol.    Review of Systems  Review of Systems   Constitutional:  Negative for fever.   HENT:  Positive for congestion and sore throat.    Respiratory:  Positive for cough, chest tightness and shortness of breath.    Musculoskeletal:  Positive for arthralgias and myalgias.                                  Objective    Vitals:    04/22/25 1553   BP: 120/82   Pulse: 78   Resp: 18   Temp: 36.8 °C (98.2 °F)   SpO2: 98%   Weight: 72.6 kg (160 lb)   Height: 1.562 m (5' 1.5\")     Patient's last menstrual period was 04/22/2025 (approximate).    Physical Exam  Constitutional:       General: She is not in acute distress.     Appearance: Normal appearance. She is normal weight. She is ill-appearing. She " is not toxic-appearing or diaphoretic.   HENT:      Head: Normocephalic. No right periorbital erythema or left periorbital erythema.      Jaw: There is normal jaw occlusion. No trismus.      Right Ear: Tympanic membrane, ear canal and external ear normal. There is no impacted cerumen.      Left Ear: Tympanic membrane, ear canal and external ear normal. There is no impacted cerumen.      Mouth/Throat:      Mouth: Mucous membranes are moist.      Pharynx: Oropharynx is clear. Uvula midline. No pharyngeal swelling, oropharyngeal exudate, posterior oropharyngeal erythema, uvula swelling or postnasal drip.      Tonsils: No tonsillar exudate or tonsillar abscesses.   Eyes:      General: No scleral icterus.        Right eye: No discharge.         Left eye: No discharge.      Conjunctiva/sclera: Conjunctivae normal.   Neck:      Trachea: Phonation normal.   Cardiovascular:      Rate and Rhythm: Normal rate and regular rhythm.      Heart sounds: No murmur heard.     No friction rub. No gallop.   Pulmonary:      Effort: Pulmonary effort is normal. No respiratory distress.      Breath sounds: No stridor. Wheezing present. No rhonchi or rales.      Comments: Frequent dry sounding cough   Neurological:      Mental Status: She is alert.   Psychiatric:         Mood and Affect: Mood normal.         Behavior: Behavior normal.         Thought Content: Thought content normal.         Procedures    Point of Care Test & Imaging Results from this visit  Results for orders placed or performed in visit on 04/22/25   POCT SPOTFIRE R/ST Panel Mini w/COVID ([facility]) manually resulted    Specimen: Swab   Result Value Ref Range    POC Sars-Cov-2 PCR Negative Negative    POC Respiratory Syncytial Virus PCR Negative Negative    POC Influenza A Virus PCR Negative Negative    POC Influenza B Virus PCR Negative Negative    POC Human Rhinovirus PCR Positive (A) Negative   POCT rapid strep A manually resulted   Result Value Ref Range    POC Rapid  Strep Negative Negative      Imaging  No results found.    Cardiology, Vascular, and Other Imaging  No other imaging results found for the past 2 days      Diagnostic study results (if any) were reviewed by Anila Matias PA-C.    Assessment/Plan   Allergies, medications, history, and pertinent labs/EKGs/Imaging reviewed by Anila Matias PA-C.     Medical Decision Making  Patient is a 35 year old female presenting with cough, body aches, congestion x 2 days. Hemodynamically stable. Nontoxic appearing, no meningismus. Posterior oropharynx clear, no exudates or vesicular lesions. Uvula is midline and there is no asymmetrical swelling of tonsils, drooling, trismus or muffled voice to suggest RPA or PTA. TM without erythema or bulging to suggest otitis media. Lungs with faint wheezing and coarse breath sounds but no findings of pneumonia. Viral swab positive for rhinovirus. Suspect viral URI and asthma exacerbation.  Will treat with prednisone and inhaler. Declined work note. Discussed supportive care, OTC medications as needed, tylenol/ibuprofen for pain or fever, rest, fluids. ER if trouble swallowing, difficulty breathing, dizziness, chest pain or shortness of breath.      At time of discharge, patient was clinically well-appearing and appropriate for outpatient management. The patient/parent/guardian was educated regarding diagnosis, supportive care, OTC and Rx medications. The patient/parent/guardian was given the opportunity to ask questions prior to discharge. They verbalized understanding of discussion of treatment plan, expected course of illness and/or injury, indications on when to return to , when to seek further evaluation in ED/call 911, and the need to follow up with PCP and/or specialist as referred. Patient/parent/guardian was provided with work/school documentation if requested. Patient stable upon discharge.     Orders and Diagnoses  Diagnoses and all orders for this visit:  Viral URI  Cough,  unspecified type  -     POCT SPOTFIRE R/ST Panel Mini w/COVID ([facility]) manually resulted  -     POCT rapid strep A manually resulted  -     brompheniramine-pseudoeph-DM 2-30-10 mg/5 mL syrup; Take 5 mL by mouth every 6 hours for 5 days.  Rhinovirus  Exacerbation of asthma, unspecified asthma severity, unspecified whether persistent ([facility])  -     predniSONE (Deltasone) 50 mg tablet; Take 1 tablet (50 mg) by mouth once daily for 5 days.  -     albuterol 90 mcg/actuation inhaler; Inhale 2 puffs every 6 hours if needed for wheezing or other (cough or chest tightness).      Medical Admin Record      Patient disposition: Home    Electronically signed by Anila Matias PA-C  4:34 PM           [1] (Not in a hospital admission)   [2]   Past Medical History:  Diagnosis Date    Chlamydial infection, unspecified     Chlamydia    Dysuria 10/27/2014    Dysuria    Encounter for fitting and adjustment of spectacles and contact lenses     Contact lens/glasses fitting    Encounter for gynecological examination (general) (routine) without abnormal findings 05/15/2015    Well woman exam    Encounter for insertion of intrauterine contraceptive device     Encounter for insertion of mirena IUD    Encounter for insertion of intrauterine contraceptive device     Encounter for insertion of mirena IUD    Encounter for supervision of normal first pregnancy, unspecified trimester ([facility])     Primigravida    Other conditions influencing health status     History of pregnancy    Other problems related to lifestyle 07/12/2016    Other problems related to lifestyle    Other specified health status     Last menstrual period (LMP) > 10 days ago    Other specified postprocedural states     Required emergent intubation    Personal history of other diseases of the nervous system and sense organs     History of sleep disturbance    Personal history of other diseases of the respiratory system     Personal history of asthma    Personal history  of other infectious and parasitic diseases     History of sexually transmitted disease    Personal history of other mental and behavioral disorders     History of depression    Type A blood, Rh negative     Blood type A-    Urogenital trichomoniasis, unspecified     Trichs - trichomonas vaginalis infection   [3]   Past Surgical History:  Procedure Laterality Date    OTHER SURGICAL HISTORY  09/29/2014    Dental Surgery    OTHER SURGICAL HISTORY  10/12/2018    Hysteroscopy With Removal Of Impacted Foreign Body

## 2025-05-07 ENCOUNTER — APPOINTMENT (OUTPATIENT)
Dept: OBSTETRICS AND GYNECOLOGY | Facility: CLINIC | Age: 35
End: 2025-05-07
Payer: COMMERCIAL

## 2025-05-07 VITALS
WEIGHT: 158 LBS | DIASTOLIC BLOOD PRESSURE: 66 MMHG | BODY MASS INDEX: 29.83 KG/M2 | SYSTOLIC BLOOD PRESSURE: 128 MMHG | HEIGHT: 61 IN

## 2025-05-07 DIAGNOSIS — L73.2 HIDRADENITIS SUPPURATIVA: ICD-10-CM

## 2025-05-07 DIAGNOSIS — Z30.011 ENCOUNTER FOR INITIAL PRESCRIPTION OF CONTRACEPTIVE PILLS: Primary | ICD-10-CM

## 2025-05-07 PROCEDURE — 3008F BODY MASS INDEX DOCD: CPT | Performed by: ADVANCED PRACTICE MIDWIFE

## 2025-05-07 PROCEDURE — 99213 OFFICE O/P EST LOW 20 MIN: CPT | Performed by: ADVANCED PRACTICE MIDWIFE

## 2025-05-07 RX ORDER — NORGESTIMATE AND ETHINYL ESTRADIOL 0.25-0.035
1 KIT ORAL DAILY
Qty: 28 TABLET | Refills: 11 | Status: CANCELLED | OUTPATIENT
Start: 2025-05-07 | End: 2026-05-07

## 2025-05-07 RX ORDER — NORGESTIMATE AND ETHINYL ESTRADIOL 0.25-0.035
1 KIT ORAL DAILY
Qty: 28 TABLET | Refills: 11 | Status: SHIPPED | OUTPATIENT
Start: 2025-05-07 | End: 2026-05-07

## 2025-05-07 ASSESSMENT — PAIN SCALES - GENERAL: PAINLEVEL_OUTOF10: 0-NO PAIN

## 2025-05-07 NOTE — PROGRESS NOTES
Subjective   Patient ID: Brittany Chavez is a 35 y.o. female who presents for Follow-up (Discuss Hormones and lump in groin on right side).    Pt. Here to discuss OCP  Would like to try a different formulation, getting cycle prior to placebo week.     Did not like IUD, had to go to OR for removal in the past.     Denies HTN, migraines, tobacco use.     Irregular cycles, OCP ran out, period every 17 days without OCP, every 21-23 day with it, lasting 7 days.   Lump in groin area, not painful, history of HS but only lesion on the right outer labia in the past with I&D 2021.    HPI    Review of Systems    Objective   Physical Exam  Constitutional:       Appearance: Normal appearance. She is normal weight.   Genitourinary:         Comments: Firm ~2cm lump in right groin  No erythema or drainage   Neurological:      Mental Status: She is alert.   Psychiatric:         Mood and Affect: Mood normal.         Behavior: Behavior normal.         Thought Content: Thought content normal.         Judgment: Judgment normal.         Assessment/Plan   Problem List Items Addressed This Visit           ICD-10-CM       Medium    Encounter for initial prescription of contraceptive pills - Primary Z30.011    Relevant Medications    norgestimate-ethinyl estradiol (Ortho-Cyclen) 0.25-0.035 mg tablet    Hidradenitis suppurativa L73.2    Warm compresses to area, soak in tub  If signs of infection develop, to call office          Relevant Orders    Referral to Dermatology            MAE Benjamin, RYAN 05/08/25 7:05 AM

## 2025-05-08 PROBLEM — L73.2 HIDRADENITIS SUPPURATIVA: Status: ACTIVE | Noted: 2025-05-08

## 2025-05-08 PROBLEM — Z30.011 ENCOUNTER FOR INITIAL PRESCRIPTION OF CONTRACEPTIVE PILLS: Status: ACTIVE | Noted: 2025-05-08

## 2025-05-20 ENCOUNTER — APPOINTMENT (OUTPATIENT)
Dept: INTEGRATIVE MEDICINE | Facility: CLINIC | Age: 35
End: 2025-05-20
Payer: COMMERCIAL

## 2025-05-20 DIAGNOSIS — M99.01 SEGMENTAL AND SOMATIC DYSFUNCTION OF CERVICAL REGION: Primary | ICD-10-CM

## 2025-05-20 DIAGNOSIS — M79.10 MYALGIA: ICD-10-CM

## 2025-05-20 DIAGNOSIS — M99.00 SEGMENTAL AND SOMATIC DYSFUNCTION OF HEAD REGION: ICD-10-CM

## 2025-05-20 DIAGNOSIS — M99.02 SEGMENTAL AND SOMATIC DYSFUNCTION OF THORACIC REGION: ICD-10-CM

## 2025-05-20 DIAGNOSIS — M79.9 POSTURAL STRAIN: ICD-10-CM

## 2025-05-20 DIAGNOSIS — M54.2 CERVICALGIA: ICD-10-CM

## 2025-05-20 PROCEDURE — 98941 CHIROPRACT MANJ 3-4 REGIONS: CPT | Performed by: CHIROPRACTOR

## 2025-05-20 NOTE — PROGRESS NOTES
Subjective   Patient ID: Brittany Chavez is a 35 y.o. female who presents May 20, 2025 for neck pain.    (7/15) VPCY    Today, the patient rates their degree of pain as a 1 out of 10 on the numeric pain rating scale.     Brittany returns for continued treatment of neck pain. Patient states that she is well. She states that she has more neck tightness than pain today. She states that she has not been able to crack her own neck to the right recently. Denies any associated symptoms or change in medical history since last visit.   ____________________________________________________  Initial Exam:  Brittany presents for evaluation and treatment of neck pain. Patients states that symptoms began in June 2024 upon waking. Patient denies any injury/trauma/accident or previous neck symptoms. She states that pain starts at the base of the skull and goes down to the top of the shoulders, bilaterally. She states that she has had an increase in headaches since neck pain began and she gets 2-3 headaches a month currently. She states that lying down, trying to sleep, and moving her head while driving increase symptoms while nothing has helped relieve symptoms. Patient states that she has been in PT for 2 months and has not noted any relief in symptoms from treatment. She states that urgent care prescribed NSAIDs and muscle relaxants, which she states did not decrease symptoms. Patient denies any difficulty speaking/swallowing, vision changes, bowel/bladder issues, chest pain/shortness of breath, numbness/tingling. Patient is new to chiropractic care.            Objective   Physical Exam  Constitutional:       General: She is not in acute distress.     Appearance: Normal appearance.       HENT:      Head: Normocephalic and atraumatic.   Musculoskeletal:      Cervical back: Tenderness present. Pain with movement present. Decreased range of motion.   Neurological:      General: No focal deficit present.      Mental Status: She is alert  and oriented to person, place, and time.      Cranial Nerves: No dysarthria or facial asymmetry.      Sensory: Sensation is intact.      Motor: Motor function is intact.      Coordination: Coordination is intact.      Gait: Gait is intact.   Psychiatric:         Attention and Perception: Attention normal.         Mood and Affect: Mood normal.         Speech: Speech normal.         Behavior: Behavior is cooperative.         Palpation of the following regions revealed:  Cervical: Suboccipitals bilateral, hypertonicity and tenderness.  Upper trapezius bilateral, hypertonicity and tenderness.  Levator scapulae bilateral, hypertonicity and tenderness.  Cervical paraspinals bilateral, hypertonicity and tenderness.  Thoracic: Thoracic paraspinals bilateral, hypertonicity and tenderness.    Segmental Joint(s): Segmental joint dysfunction was assessed with motion palpation and is identified in the following areas:  Cervical : C0 C1 C2 C4 C6 C7  Thoracic : T1, T2., and T6    Assessment/Plan   Today's Treatment Included: Spinal manipulation to the following regions of segmental dysfunction identified on examination, using age-appropriate and injury specific force. Segmental Joint(s) Cervical : C0 C1 C2 C4 C6 C7 Segmental Joint(s) Thoracic : T1, T2., and T6   Chiropractic manipulation treatment techniques utilized: Diversified CMT and Cervical Manual Traction  Soft tissue mobilization techniques utilized during treatment: Ischemic compression    Soft-tissue mobilization was performed in the following areas:  Suboccipital bilateral, Cervical paraspinal mm. bilateral, Upper Trapezius bilateral, and Levator Scap. bilateral  Thoracic Paraspinal mm. bilateral    Recommended follow-up in 1 month(s).     The patient tolerated today's treatment with little or no additional discomfort and was instructed to contact the office for questions or concerns.

## 2025-06-24 ENCOUNTER — APPOINTMENT (OUTPATIENT)
Dept: INTEGRATIVE MEDICINE | Facility: CLINIC | Age: 35
End: 2025-06-24
Payer: COMMERCIAL

## 2025-07-08 ENCOUNTER — APPOINTMENT (OUTPATIENT)
Dept: INTEGRATIVE MEDICINE | Facility: CLINIC | Age: 35
End: 2025-07-08
Payer: COMMERCIAL

## 2025-07-08 DIAGNOSIS — M99.01 SEGMENTAL AND SOMATIC DYSFUNCTION OF CERVICAL REGION: Primary | ICD-10-CM

## 2025-07-08 DIAGNOSIS — M54.2 CERVICALGIA: ICD-10-CM

## 2025-07-08 DIAGNOSIS — M99.00 SEGMENTAL AND SOMATIC DYSFUNCTION OF HEAD REGION: ICD-10-CM

## 2025-07-08 DIAGNOSIS — M79.10 MYALGIA: ICD-10-CM

## 2025-07-08 DIAGNOSIS — M99.02 SEGMENTAL AND SOMATIC DYSFUNCTION OF THORACIC REGION: ICD-10-CM

## 2025-07-08 DIAGNOSIS — M79.9 POSTURAL STRAIN: ICD-10-CM

## 2025-07-08 PROCEDURE — 98941 CHIROPRACT MANJ 3-4 REGIONS: CPT | Performed by: CHIROPRACTOR

## 2025-07-08 NOTE — PROGRESS NOTES
Subjective   Patient ID: Brittany Chavez is a 35 y.o. female who presents July 8, 2025 for neck pain.    (8/15) VPCY    Today, the patient rates their degree of pain as a 2 out of 10 on the numeric pain rating scale.     Brittany returns for continued treatment of neck pain. Patient states that she is okay today. She states that she has increased neck tension and some mild pain. She states that family life has been stressful recently and she has not been treated for some time. Denies any associated symptoms or change in medical history since last visit.   ____________________________________________________  Initial Exam:  Brittany presents for evaluation and treatment of neck pain. Patients states that symptoms began in June 2024 upon waking. Patient denies any injury/trauma/accident or previous neck symptoms. She states that pain starts at the base of the skull and goes down to the top of the shoulders, bilaterally. She states that she has had an increase in headaches since neck pain began and she gets 2-3 headaches a month currently. She states that lying down, trying to sleep, and moving her head while driving increase symptoms while nothing has helped relieve symptoms. Patient states that she has been in PT for 2 months and has not noted any relief in symptoms from treatment. She states that urgent care prescribed NSAIDs and muscle relaxants, which she states did not decrease symptoms. Patient denies any difficulty speaking/swallowing, vision changes, bowel/bladder issues, chest pain/shortness of breath, numbness/tingling. Patient is new to chiropractic care.            Objective   Physical Exam  Constitutional:       General: She is not in acute distress.     Appearance: Normal appearance.       HENT:      Head: Normocephalic and atraumatic.   Musculoskeletal:      Cervical back: Tenderness present. Pain with movement present. Decreased range of motion.   Neurological:      General: No focal deficit present.       Mental Status: She is alert and oriented to person, place, and time.      Cranial Nerves: No dysarthria or facial asymmetry.      Sensory: Sensation is intact.      Motor: Motor function is intact.      Coordination: Coordination is intact.      Gait: Gait is intact.   Psychiatric:         Attention and Perception: Attention normal.         Mood and Affect: Mood normal.         Speech: Speech normal.         Behavior: Behavior is cooperative.         Palpation of the following regions revealed:  Cervical: Suboccipitals bilateral, hypertonicity and tenderness.  Upper trapezius bilateral, hypertonicity and tenderness.  Levator scapulae bilateral, hypertonicity and tenderness.  Cervical paraspinals bilateral, hypertonicity and tenderness.  Thoracic: Thoracic paraspinals bilateral, hypertonicity and tenderness.    Segmental Joint(s): Segmental joint dysfunction was assessed with motion palpation and is identified in the following areas:  Cervical : C0 C1 C2 C4 C6 C7  Thoracic : T1, T2., and T6    Assessment/Plan   Today's Treatment Included: Spinal manipulation to the following regions of segmental dysfunction identified on examination, using age-appropriate and injury specific force. Segmental Joint(s) Cervical : C0 C1 C2 C4 C6 C7 Segmental Joint(s) Thoracic : T1, T2., and T6   Chiropractic manipulation treatment techniques utilized: Diversified CMT and Cervical Manual Traction  Soft tissue mobilization techniques utilized during treatment: Ischemic compression    Soft-tissue mobilization was performed in the following areas:  Suboccipital bilateral, Cervical paraspinal mm. bilateral, Upper Trapezius bilateral, and Levator Scap. bilateral  Thoracic Paraspinal mm. bilateral    Recommended follow-up in 1 month(s).     The patient tolerated today's treatment with little or no additional discomfort and was instructed to contact the office for questions or concerns.

## 2025-08-12 ENCOUNTER — APPOINTMENT (OUTPATIENT)
Dept: INTEGRATIVE MEDICINE | Facility: CLINIC | Age: 35
End: 2025-08-12
Payer: COMMERCIAL

## 2025-08-12 DIAGNOSIS — M99.02 SEGMENTAL AND SOMATIC DYSFUNCTION OF THORACIC REGION: ICD-10-CM

## 2025-08-12 DIAGNOSIS — M79.10 MYALGIA: ICD-10-CM

## 2025-08-12 DIAGNOSIS — M79.9 POSTURAL STRAIN: ICD-10-CM

## 2025-08-12 DIAGNOSIS — M54.2 CERVICALGIA: ICD-10-CM

## 2025-08-12 DIAGNOSIS — M99.01 SEGMENTAL AND SOMATIC DYSFUNCTION OF CERVICAL REGION: Primary | ICD-10-CM

## 2025-08-12 DIAGNOSIS — M99.00 SEGMENTAL AND SOMATIC DYSFUNCTION OF HEAD REGION: ICD-10-CM

## 2025-08-12 PROCEDURE — 98941 CHIROPRACT MANJ 3-4 REGIONS: CPT | Performed by: CHIROPRACTOR
